# Patient Record
Sex: MALE | Race: AMERICAN INDIAN OR ALASKA NATIVE | NOT HISPANIC OR LATINO | RURAL
[De-identification: names, ages, dates, MRNs, and addresses within clinical notes are randomized per-mention and may not be internally consistent; named-entity substitution may affect disease eponyms.]

---

## 2017-02-10 ENCOUNTER — HISTORICAL (OUTPATIENT)
Dept: ADMINISTRATIVE | Facility: HOSPITAL | Age: 37
End: 2017-02-10

## 2017-03-02 LAB
LAB AP CLINICAL INFORMATION: NORMAL
LAB AP DIAGNOSIS - HISTORICAL: NORMAL
LAB AP GROSS PATHOLOGY - HISTORICAL: NORMAL
LAB AP SPECIMEN SUBMITTED - HISTORICAL: NORMAL

## 2023-07-16 ENCOUNTER — HOSPITAL ENCOUNTER (INPATIENT)
Facility: HOSPITAL | Age: 43
LOS: 3 days | Discharge: HOME | DRG: 673 | End: 2023-07-19
Attending: INTERNAL MEDICINE | Admitting: HOSPITALIST
Payer: MEDICARE

## 2023-07-16 DIAGNOSIS — E11.22 TYPE 2 DIABETES MELLITUS WITH STAGE 5 CHRONIC KIDNEY DISEASE NOT ON CHRONIC DIALYSIS, UNSPECIFIED WHETHER LONG TERM INSULIN USE: ICD-10-CM

## 2023-07-16 DIAGNOSIS — D63.8 ANEMIA OF CHRONIC DISEASE: ICD-10-CM

## 2023-07-16 DIAGNOSIS — N19 UREMIA DUE TO INADEQUATE RENAL PERFUSION: ICD-10-CM

## 2023-07-16 DIAGNOSIS — I10 PRIMARY HYPERTENSION: ICD-10-CM

## 2023-07-16 DIAGNOSIS — I63.9 LEFT-SIDED CEREBROVASCULAR ACCIDENT (CVA): ICD-10-CM

## 2023-07-16 DIAGNOSIS — Z01.818 PREOPERATIVE EVALUATION OF A MEDICAL CONDITION TO RULE OUT SURGICAL CONTRAINDICATIONS (TAR REQUIRED): ICD-10-CM

## 2023-07-16 DIAGNOSIS — K21.9 GASTROESOPHAGEAL REFLUX DISEASE, UNSPECIFIED WHETHER ESOPHAGITIS PRESENT: ICD-10-CM

## 2023-07-16 DIAGNOSIS — I10 HYPERTENSION: ICD-10-CM

## 2023-07-16 DIAGNOSIS — I10 HYPERTENSION, UNSPECIFIED TYPE: ICD-10-CM

## 2023-07-16 DIAGNOSIS — N18.6 ESRD (END STAGE RENAL DISEASE): Primary | ICD-10-CM

## 2023-07-16 DIAGNOSIS — N17.9 ACUTE RENAL FAILURE: ICD-10-CM

## 2023-07-16 DIAGNOSIS — N18.5 TYPE 2 DIABETES MELLITUS WITH STAGE 5 CHRONIC KIDNEY DISEASE NOT ON CHRONIC DIALYSIS, UNSPECIFIED WHETHER LONG TERM INSULIN USE: ICD-10-CM

## 2023-07-16 PROBLEM — E11.29 TYPE 2 DIABETES MELLITUS WITH RENAL COMPLICATION: Status: ACTIVE | Noted: 2023-07-16

## 2023-07-16 PROBLEM — Z89.511 ACQUIRED ABSENCE OF RIGHT LEG BELOW KNEE: Status: ACTIVE | Noted: 2019-09-04

## 2023-07-16 PROBLEM — E78.00 HYPERCHOLESTEREMIA: Status: ACTIVE | Noted: 2019-08-30

## 2023-07-16 LAB
ALBUMIN SERPL BCP-MCNC: 1.8 G/DL (ref 3.5–5)
ALBUMIN/GLOB SERPL: 0.4 {RATIO}
ALP SERPL-CCNC: 106 U/L (ref 45–115)
ALT SERPL W P-5'-P-CCNC: 10 U/L (ref 16–61)
ANION GAP SERPL CALCULATED.3IONS-SCNC: 17 MMOL/L (ref 7–16)
APTT PPP: 54.9 SECONDS (ref 25.2–37.3)
AST SERPL W P-5'-P-CCNC: 14 U/L (ref 15–37)
BASOPHILS # BLD AUTO: 0.03 K/UL (ref 0–0.2)
BASOPHILS NFR BLD AUTO: 0.4 % (ref 0–1)
BILIRUB SERPL-MCNC: 0.3 MG/DL (ref ?–1.2)
BUN SERPL-MCNC: 56 MG/DL (ref 7–18)
BUN/CREAT SERPL: 5 (ref 6–20)
CALCIUM SERPL-MCNC: 7.2 MG/DL (ref 8.5–10.1)
CHLORIDE SERPL-SCNC: 101 MMOL/L (ref 98–107)
CO2 SERPL-SCNC: 23 MMOL/L (ref 21–32)
CREAT SERPL-MCNC: 10.49 MG/DL (ref 0.7–1.3)
DIFFERENTIAL METHOD BLD: ABNORMAL
EGFR (NO RACE VARIABLE) (RUSH/TITUS): 6 ML/MIN/1.73M2
EOSINOPHIL # BLD AUTO: 0.12 K/UL (ref 0–0.5)
EOSINOPHIL NFR BLD AUTO: 1.6 % (ref 1–4)
ERYTHROCYTE [DISTWIDTH] IN BLOOD BY AUTOMATED COUNT: 13.7 % (ref 11.5–14.5)
FERRITIN SERPL-MCNC: 519 NG/ML (ref 26–388)
FOLATE SERPL-MCNC: 14.3 NG/ML (ref 3.1–17.5)
GLOBULIN SER-MCNC: 4.8 G/DL (ref 2–4)
GLUCOSE SERPL-MCNC: 87 MG/DL (ref 74–106)
HCT VFR BLD AUTO: 26.1 % (ref 40–54)
HGB BLD-MCNC: 9 G/DL (ref 13.5–18)
IMM GRANULOCYTES # BLD AUTO: 0.13 K/UL (ref 0–0.04)
IMM GRANULOCYTES NFR BLD: 1.8 % (ref 0–0.4)
INDIRECT COOMBS: NORMAL
INR BLD: 1.17
IRON SATN MFR SERPL: 40 % (ref 14–50)
IRON SERPL-MCNC: 53 ΜG/DL (ref 65–175)
LYMPHOCYTES # BLD AUTO: 1.91 K/UL (ref 1–4.8)
LYMPHOCYTES NFR BLD AUTO: 25.8 % (ref 27–41)
MAGNESIUM SERPL-MCNC: 1.7 MG/DL (ref 1.7–2.3)
MCH RBC QN AUTO: 29.9 PG (ref 27–31)
MCHC RBC AUTO-ENTMCNC: 34.5 G/DL (ref 32–36)
MCV RBC AUTO: 86.7 FL (ref 80–96)
MONOCYTES # BLD AUTO: 0.45 K/UL (ref 0–0.8)
MONOCYTES NFR BLD AUTO: 6.1 % (ref 2–6)
MPC BLD CALC-MCNC: 7.9 FL (ref 9.4–12.4)
NEUTROPHILS # BLD AUTO: 4.76 K/UL (ref 1.8–7.7)
NEUTROPHILS NFR BLD AUTO: 64.3 % (ref 53–65)
NRBC # BLD AUTO: 0 X10E3/UL
NRBC, AUTO (.00): 0 %
PHOSPHATE SERPL-MCNC: 8.3 MG/DL (ref 2.5–4.5)
PLATELET # BLD AUTO: 449 K/UL (ref 150–400)
POTASSIUM SERPL-SCNC: 3.2 MMOL/L (ref 3.5–5.1)
PROT SERPL-MCNC: 6.6 G/DL (ref 6.4–8.2)
PROTHROMBIN TIME: 14.8 SECONDS (ref 11.7–14.7)
RBC # BLD AUTO: 3.01 M/UL (ref 4.6–6.2)
RH BLD: NORMAL
SODIUM SERPL-SCNC: 138 MMOL/L (ref 136–145)
SPECIMEN OUTDATE: NORMAL
TIBC SERPL-MCNC: 132 ΜG/DL (ref 250–450)
TSH SERPL DL<=0.005 MIU/L-ACNC: 1.81 UIU/ML (ref 0.36–3.74)
WBC # BLD AUTO: 7.4 K/UL (ref 4.5–11)

## 2023-07-16 PROCEDURE — 83540 ASSAY OF IRON: CPT | Performed by: HOSPITALIST

## 2023-07-16 PROCEDURE — 11000001 HC ACUTE MED/SURG PRIVATE ROOM

## 2023-07-16 PROCEDURE — 82746 ASSAY OF FOLIC ACID SERUM: CPT | Performed by: HOSPITALIST

## 2023-07-16 PROCEDURE — 84443 ASSAY THYROID STIM HORMONE: CPT | Performed by: HOSPITALIST

## 2023-07-16 PROCEDURE — 99223 PR INITIAL HOSPITAL CARE,LEVL III: ICD-10-PCS | Mod: ,,, | Performed by: HOSPITALIST

## 2023-07-16 PROCEDURE — 86900 BLOOD TYPING SEROLOGIC ABO: CPT | Performed by: HOSPITALIST

## 2023-07-16 PROCEDURE — 85610 PROTHROMBIN TIME: CPT | Performed by: HOSPITALIST

## 2023-07-16 PROCEDURE — 83735 ASSAY OF MAGNESIUM: CPT | Performed by: HOSPITALIST

## 2023-07-16 PROCEDURE — 82728 ASSAY OF FERRITIN: CPT | Performed by: HOSPITALIST

## 2023-07-16 PROCEDURE — 84100 ASSAY OF PHOSPHORUS: CPT | Performed by: HOSPITALIST

## 2023-07-16 PROCEDURE — 85730 THROMBOPLASTIN TIME PARTIAL: CPT | Performed by: HOSPITALIST

## 2023-07-16 PROCEDURE — 80053 COMPREHEN METABOLIC PANEL: CPT | Performed by: HOSPITALIST

## 2023-07-16 PROCEDURE — 99223 1ST HOSP IP/OBS HIGH 75: CPT | Mod: ,,, | Performed by: HOSPITALIST

## 2023-07-16 PROCEDURE — 83550 IRON BINDING TEST: CPT | Performed by: HOSPITALIST

## 2023-07-16 PROCEDURE — 85025 COMPLETE CBC W/AUTO DIFF WBC: CPT | Performed by: HOSPITALIST

## 2023-07-16 PROCEDURE — 82607 VITAMIN B-12: CPT | Performed by: HOSPITALIST

## 2023-07-16 RX ORDER — OMEPRAZOLE 20 MG/1
20 CAPSULE, DELAYED RELEASE ORAL DAILY
COMMUNITY

## 2023-07-16 RX ORDER — HYDRALAZINE HYDROCHLORIDE 10 MG/1
10 TABLET, FILM COATED ORAL EVERY 8 HOURS
Status: DISCONTINUED | OUTPATIENT
Start: 2023-07-17 | End: 2023-07-19 | Stop reason: HOSPADM

## 2023-07-16 RX ORDER — TRAZODONE HYDROCHLORIDE 50 MG/1
50 TABLET ORAL NIGHTLY PRN
Status: DISCONTINUED | OUTPATIENT
Start: 2023-07-16 | End: 2023-07-19 | Stop reason: HOSPADM

## 2023-07-16 RX ORDER — TAMSULOSIN HYDROCHLORIDE 0.4 MG/1
0.4 CAPSULE ORAL DAILY
Status: ON HOLD | COMMUNITY
End: 2023-07-19 | Stop reason: HOSPADM

## 2023-07-16 RX ORDER — GLIPIZIDE 2.5 MG/1
2.5 TABLET, EXTENDED RELEASE ORAL
Status: ON HOLD | COMMUNITY
End: 2023-07-19 | Stop reason: HOSPADM

## 2023-07-16 RX ORDER — CARVEDILOL 3.12 MG/1
3.12 TABLET ORAL 2 TIMES DAILY
COMMUNITY

## 2023-07-16 RX ORDER — ACETAMINOPHEN 500 MG
1000 TABLET ORAL EVERY 6 HOURS PRN
Status: DISCONTINUED | OUTPATIENT
Start: 2023-07-16 | End: 2023-07-19 | Stop reason: HOSPADM

## 2023-07-16 RX ORDER — HYDRALAZINE HYDROCHLORIDE 10 MG/1
10 TABLET, FILM COATED ORAL EVERY 8 HOURS
COMMUNITY

## 2023-07-16 RX ORDER — GUAIFENESIN/DEXTROMETHORPHAN 100-10MG/5
10 SYRUP ORAL EVERY 6 HOURS PRN
Status: DISCONTINUED | OUTPATIENT
Start: 2023-07-16 | End: 2023-07-19 | Stop reason: HOSPADM

## 2023-07-16 RX ORDER — FERROUS SULFATE 325(65) MG
325 TABLET, DELAYED RELEASE (ENTERIC COATED) ORAL 2 TIMES DAILY
COMMUNITY

## 2023-07-16 RX ORDER — SIMETHICONE 80 MG
1 TABLET,CHEWABLE ORAL 3 TIMES DAILY PRN
Status: DISCONTINUED | OUTPATIENT
Start: 2023-07-16 | End: 2023-07-19 | Stop reason: HOSPADM

## 2023-07-16 RX ORDER — ATORVASTATIN CALCIUM 40 MG/1
40 TABLET, FILM COATED ORAL NIGHTLY
Status: DISCONTINUED | OUTPATIENT
Start: 2023-07-17 | End: 2023-07-19 | Stop reason: HOSPADM

## 2023-07-16 RX ORDER — CLOPIDOGREL BISULFATE 75 MG/1
75 TABLET ORAL DAILY
COMMUNITY

## 2023-07-16 RX ORDER — ISOSORBIDE MONONITRATE 30 MG/1
30 TABLET, EXTENDED RELEASE ORAL DAILY
Status: ON HOLD | COMMUNITY
End: 2023-07-19 | Stop reason: HOSPADM

## 2023-07-16 RX ORDER — FUROSEMIDE 80 MG/1
80 TABLET ORAL 2 TIMES DAILY PRN
Status: ON HOLD | COMMUNITY
End: 2023-07-19 | Stop reason: HOSPADM

## 2023-07-16 RX ORDER — ONDANSETRON 2 MG/ML
8 INJECTION INTRAMUSCULAR; INTRAVENOUS EVERY 6 HOURS PRN
Status: DISCONTINUED | OUTPATIENT
Start: 2023-07-16 | End: 2023-07-19 | Stop reason: HOSPADM

## 2023-07-16 RX ORDER — NIFEDIPINE 30 MG/1
30 TABLET, FILM COATED, EXTENDED RELEASE ORAL DAILY
Status: ON HOLD | COMMUNITY
End: 2023-07-19 | Stop reason: HOSPADM

## 2023-07-16 RX ORDER — CARVEDILOL 3.12 MG/1
3.12 TABLET ORAL 2 TIMES DAILY
Status: DISCONTINUED | OUTPATIENT
Start: 2023-07-17 | End: 2023-07-19 | Stop reason: HOSPADM

## 2023-07-16 RX ORDER — ROSUVASTATIN CALCIUM 40 MG/1
40 TABLET, COATED ORAL NIGHTLY
COMMUNITY

## 2023-07-16 RX ORDER — BISACODYL 5 MG
10 TABLET, DELAYED RELEASE (ENTERIC COATED) ORAL DAILY PRN
Status: DISCONTINUED | OUTPATIENT
Start: 2023-07-16 | End: 2023-07-19 | Stop reason: HOSPADM

## 2023-07-16 RX ORDER — TAMSULOSIN HYDROCHLORIDE 0.4 MG/1
0.4 CAPSULE ORAL DAILY
Status: DISCONTINUED | OUTPATIENT
Start: 2023-07-17 | End: 2023-07-19

## 2023-07-16 RX ORDER — SODIUM BICARBONATE 650 MG/1
650 TABLET ORAL 3 TIMES DAILY
Status: ON HOLD | COMMUNITY
End: 2023-07-19 | Stop reason: HOSPADM

## 2023-07-17 ENCOUNTER — ANESTHESIA (OUTPATIENT)
Dept: SURGERY | Facility: HOSPITAL | Age: 43
DRG: 673 | End: 2023-07-17
Payer: MEDICARE

## 2023-07-17 ENCOUNTER — ANESTHESIA EVENT (OUTPATIENT)
Dept: SURGERY | Facility: HOSPITAL | Age: 43
DRG: 673 | End: 2023-07-17
Payer: MEDICARE

## 2023-07-17 PROBLEM — N19 UREMIA DUE TO INADEQUATE RENAL PERFUSION: Status: ACTIVE | Noted: 2023-07-17

## 2023-07-17 LAB
25(OH)D3 SERPL-MCNC: 12.2 NG/ML
CHOLEST SERPL-MCNC: 73 MG/DL (ref 0–200)
CHOLEST/HDLC SERPL: 2.3 {RATIO}
GLUCOSE SERPL-MCNC: 100 MG/DL (ref 70–105)
GLUCOSE SERPL-MCNC: 111 MG/DL (ref 70–105)
GLUCOSE SERPL-MCNC: 231 MG/DL (ref 70–105)
HAV IGM SER QL: NORMAL
HBV CORE IGM SER QL: NORMAL
HBV SURFACE AG SERPL QL IA: NORMAL
HCV AB SER QL: NORMAL
HDLC SERPL-MCNC: 32 MG/DL (ref 40–60)
LDLC SERPL CALC-MCNC: 14 MG/DL
NONHDLC SERPL-MCNC: 41 MG/DL
TRIGL SERPL-MCNC: 136 MG/DL (ref 35–150)
VIT B12 SERPL-MCNC: 747 PG/ML (ref 193–986)
VLDLC SERPL-MCNC: 27 MG/DL

## 2023-07-17 PROCEDURE — 27000177 HC AIRWAY, LARYNGEAL MASK: Performed by: NURSE ANESTHETIST, CERTIFIED REGISTERED

## 2023-07-17 PROCEDURE — 93010 ELECTROCARDIOGRAM REPORT: CPT | Mod: ,,, | Performed by: HOSPITALIST

## 2023-07-17 PROCEDURE — 25000003 PHARM REV CODE 250: Performed by: NURSE ANESTHETIST, CERTIFIED REGISTERED

## 2023-07-17 PROCEDURE — 99223 PR INITIAL HOSPITAL CARE,LEVL III: ICD-10-PCS | Mod: 25,,, | Performed by: STUDENT IN AN ORGANIZED HEALTH CARE EDUCATION/TRAINING PROGRAM

## 2023-07-17 PROCEDURE — 99223 1ST HOSP IP/OBS HIGH 75: CPT | Mod: 25,,, | Performed by: STUDENT IN AN ORGANIZED HEALTH CARE EDUCATION/TRAINING PROGRAM

## 2023-07-17 PROCEDURE — 99233 SBSQ HOSP IP/OBS HIGH 50: CPT | Mod: ,,, | Performed by: HOSPITALIST

## 2023-07-17 PROCEDURE — 82306 VITAMIN D 25 HYDROXY: CPT

## 2023-07-17 PROCEDURE — 80061 LIPID PANEL: CPT

## 2023-07-17 PROCEDURE — 11000001 HC ACUTE MED/SURG PRIVATE ROOM

## 2023-07-17 PROCEDURE — 94761 N-INVAS EAR/PLS OXIMETRY MLT: CPT

## 2023-07-17 PROCEDURE — 36000706: Performed by: STUDENT IN AN ORGANIZED HEALTH CARE EDUCATION/TRAINING PROGRAM

## 2023-07-17 PROCEDURE — 71000033 HC RECOVERY, INTIAL HOUR: Performed by: STUDENT IN AN ORGANIZED HEALTH CARE EDUCATION/TRAINING PROGRAM

## 2023-07-17 PROCEDURE — D9220A PRA ANESTHESIA: ICD-10-PCS | Mod: CRNA,,, | Performed by: NURSE ANESTHETIST, CERTIFIED REGISTERED

## 2023-07-17 PROCEDURE — 77001 CHG FLUOROGUIDE CNTRL VEN ACCESS,PLACE,REPLACE,REMOVE: ICD-10-PCS | Mod: 26,,, | Performed by: STUDENT IN AN ORGANIZED HEALTH CARE EDUCATION/TRAINING PROGRAM

## 2023-07-17 PROCEDURE — 36558 PR INSERT TUNNELED CV CATH W/O PORT OR PUMP: ICD-10-PCS | Mod: RT,,, | Performed by: STUDENT IN AN ORGANIZED HEALTH CARE EDUCATION/TRAINING PROGRAM

## 2023-07-17 PROCEDURE — 27000510 HC BLANKET BAIR HUGGER ANY SIZE: Performed by: NURSE ANESTHETIST, CERTIFIED REGISTERED

## 2023-07-17 PROCEDURE — 27000716 HC OXISENSOR PROBE, ANY SIZE: Performed by: NURSE ANESTHETIST, CERTIFIED REGISTERED

## 2023-07-17 PROCEDURE — 82962 GLUCOSE BLOOD TEST: CPT

## 2023-07-17 PROCEDURE — 36558 INSERT TUNNELED CV CATH: CPT | Mod: RT,,, | Performed by: STUDENT IN AN ORGANIZED HEALTH CARE EDUCATION/TRAINING PROGRAM

## 2023-07-17 PROCEDURE — 63600175 PHARM REV CODE 636 W HCPCS: Performed by: STUDENT IN AN ORGANIZED HEALTH CARE EDUCATION/TRAINING PROGRAM

## 2023-07-17 PROCEDURE — 63600175 PHARM REV CODE 636 W HCPCS: Performed by: INTERNAL MEDICINE

## 2023-07-17 PROCEDURE — 90935 HEMODIALYSIS ONE EVALUATION: CPT

## 2023-07-17 PROCEDURE — 36000707: Performed by: STUDENT IN AN ORGANIZED HEALTH CARE EDUCATION/TRAINING PROGRAM

## 2023-07-17 PROCEDURE — D9220A PRA ANESTHESIA: ICD-10-PCS | Mod: ANES,,, | Performed by: ANESTHESIOLOGY

## 2023-07-17 PROCEDURE — 63600175 PHARM REV CODE 636 W HCPCS: Performed by: NURSE ANESTHETIST, CERTIFIED REGISTERED

## 2023-07-17 PROCEDURE — 27000655: Performed by: NURSE ANESTHETIST, CERTIFIED REGISTERED

## 2023-07-17 PROCEDURE — 25000003 PHARM REV CODE 250: Performed by: HOSPITALIST

## 2023-07-17 PROCEDURE — 93010 EKG 12-LEAD: ICD-10-PCS | Mod: ,,, | Performed by: HOSPITALIST

## 2023-07-17 PROCEDURE — C1750 CATH, HEMODIALYSIS,LONG-TERM: HCPCS | Performed by: STUDENT IN AN ORGANIZED HEALTH CARE EDUCATION/TRAINING PROGRAM

## 2023-07-17 PROCEDURE — 25000003 PHARM REV CODE 250: Performed by: INTERNAL MEDICINE

## 2023-07-17 PROCEDURE — 77001 FLUOROGUIDE FOR VEIN DEVICE: CPT | Mod: 26,,, | Performed by: STUDENT IN AN ORGANIZED HEALTH CARE EDUCATION/TRAINING PROGRAM

## 2023-07-17 PROCEDURE — D9220A PRA ANESTHESIA: Mod: ANES,,, | Performed by: ANESTHESIOLOGY

## 2023-07-17 PROCEDURE — 25000003 PHARM REV CODE 250

## 2023-07-17 PROCEDURE — 25000003 PHARM REV CODE 250: Performed by: STUDENT IN AN ORGANIZED HEALTH CARE EDUCATION/TRAINING PROGRAM

## 2023-07-17 PROCEDURE — 93005 ELECTROCARDIOGRAM TRACING: CPT

## 2023-07-17 PROCEDURE — 37000008 HC ANESTHESIA 1ST 15 MINUTES: Performed by: STUDENT IN AN ORGANIZED HEALTH CARE EDUCATION/TRAINING PROGRAM

## 2023-07-17 PROCEDURE — D9220A PRA ANESTHESIA: Mod: CRNA,,, | Performed by: NURSE ANESTHETIST, CERTIFIED REGISTERED

## 2023-07-17 PROCEDURE — 37000009 HC ANESTHESIA EA ADD 15 MINS: Performed by: STUDENT IN AN ORGANIZED HEALTH CARE EDUCATION/TRAINING PROGRAM

## 2023-07-17 PROCEDURE — 99233 PR SUBSEQUENT HOSPITAL CARE,LEVL III: ICD-10-PCS | Mod: ,,, | Performed by: HOSPITALIST

## 2023-07-17 PROCEDURE — 80074 ACUTE HEPATITIS PANEL: CPT | Performed by: INTERNAL MEDICINE

## 2023-07-17 DEVICE — CATH GLIDEPATH 14.5F 23CM 28CM: Type: IMPLANTABLE DEVICE | Site: CHEST | Status: FUNCTIONAL

## 2023-07-17 RX ORDER — SODIUM CHLORIDE 9 MG/ML
INJECTION, SOLUTION INTRAVENOUS CONTINUOUS PRN
Status: DISCONTINUED | OUTPATIENT
Start: 2023-07-17 | End: 2023-07-17

## 2023-07-17 RX ORDER — MORPHINE SULFATE 10 MG/ML
4 INJECTION INTRAMUSCULAR; INTRAVENOUS; SUBCUTANEOUS EVERY 5 MIN PRN
Status: DISCONTINUED | OUTPATIENT
Start: 2023-07-17 | End: 2023-07-17 | Stop reason: HOSPADM

## 2023-07-17 RX ORDER — CHOLECALCIFEROL (VITAMIN D3) 25 MCG
1000 TABLET ORAL DAILY
Status: DISCONTINUED | OUTPATIENT
Start: 2023-07-18 | End: 2023-07-19 | Stop reason: HOSPADM

## 2023-07-17 RX ORDER — LIDOCAINE HYDROCHLORIDE 20 MG/ML
INJECTION, SOLUTION EPIDURAL; INFILTRATION; INTRACAUDAL; PERINEURAL
Status: DISCONTINUED | OUTPATIENT
Start: 2023-07-17 | End: 2023-07-17

## 2023-07-17 RX ORDER — ONDANSETRON 2 MG/ML
4 INJECTION INTRAMUSCULAR; INTRAVENOUS DAILY PRN
Status: DISCONTINUED | OUTPATIENT
Start: 2023-07-17 | End: 2023-07-17 | Stop reason: HOSPADM

## 2023-07-17 RX ORDER — MEPERIDINE HYDROCHLORIDE 25 MG/ML
25 INJECTION INTRAMUSCULAR; INTRAVENOUS; SUBCUTANEOUS ONCE AS NEEDED
Status: DISCONTINUED | OUTPATIENT
Start: 2023-07-17 | End: 2023-07-17 | Stop reason: HOSPADM

## 2023-07-17 RX ORDER — HEPARIN SODIUM 1000 [USP'U]/ML
INJECTION, SOLUTION INTRAVENOUS; SUBCUTANEOUS
Status: DISCONTINUED | OUTPATIENT
Start: 2023-07-17 | End: 2023-07-17 | Stop reason: HOSPADM

## 2023-07-17 RX ORDER — IBUPROFEN 200 MG
16 TABLET ORAL
Status: DISCONTINUED | OUTPATIENT
Start: 2023-07-17 | End: 2023-07-19 | Stop reason: HOSPADM

## 2023-07-17 RX ORDER — GLUCAGON 1 MG
1 KIT INJECTION
Status: DISCONTINUED | OUTPATIENT
Start: 2023-07-17 | End: 2023-07-19 | Stop reason: HOSPADM

## 2023-07-17 RX ORDER — ETOMIDATE 2 MG/ML
INJECTION INTRAVENOUS
Status: DISCONTINUED | OUTPATIENT
Start: 2023-07-17 | End: 2023-07-17

## 2023-07-17 RX ORDER — HEPARIN SODIUM 1000 [USP'U]/ML
4000 INJECTION, SOLUTION INTRAVENOUS; SUBCUTANEOUS
Status: DISCONTINUED | OUTPATIENT
Start: 2023-07-17 | End: 2023-07-19 | Stop reason: HOSPADM

## 2023-07-17 RX ORDER — PHENYLEPHRINE HYDROCHLORIDE 10 MG/ML
INJECTION INTRAVENOUS
Status: DISCONTINUED | OUTPATIENT
Start: 2023-07-17 | End: 2023-07-17

## 2023-07-17 RX ORDER — MUPIROCIN 20 MG/G
OINTMENT TOPICAL 2 TIMES DAILY
Status: DISCONTINUED | OUTPATIENT
Start: 2023-07-17 | End: 2023-07-19 | Stop reason: HOSPADM

## 2023-07-17 RX ORDER — HYDROMORPHONE HYDROCHLORIDE 2 MG/ML
0.5 INJECTION, SOLUTION INTRAMUSCULAR; INTRAVENOUS; SUBCUTANEOUS EVERY 5 MIN PRN
Status: DISCONTINUED | OUTPATIENT
Start: 2023-07-17 | End: 2023-07-17 | Stop reason: HOSPADM

## 2023-07-17 RX ORDER — PROPOFOL 10 MG/ML
VIAL (ML) INTRAVENOUS
Status: DISCONTINUED | OUTPATIENT
Start: 2023-07-17 | End: 2023-07-17

## 2023-07-17 RX ORDER — IBUPROFEN 200 MG
24 TABLET ORAL
Status: DISCONTINUED | OUTPATIENT
Start: 2023-07-17 | End: 2023-07-19 | Stop reason: HOSPADM

## 2023-07-17 RX ORDER — DIPHENHYDRAMINE HYDROCHLORIDE 50 MG/ML
25 INJECTION INTRAMUSCULAR; INTRAVENOUS EVERY 6 HOURS PRN
Status: DISCONTINUED | OUTPATIENT
Start: 2023-07-17 | End: 2023-07-17 | Stop reason: HOSPADM

## 2023-07-17 RX ORDER — LIDOCAINE HYDROCHLORIDE 10 MG/ML
INJECTION INFILTRATION; PERINEURAL
Status: DISCONTINUED | OUTPATIENT
Start: 2023-07-17 | End: 2023-07-17 | Stop reason: HOSPADM

## 2023-07-17 RX ORDER — HYDRALAZINE HYDROCHLORIDE 20 MG/ML
10 INJECTION INTRAMUSCULAR; INTRAVENOUS EVERY 6 HOURS PRN
Status: DISCONTINUED | OUTPATIENT
Start: 2023-07-17 | End: 2023-07-19 | Stop reason: HOSPADM

## 2023-07-17 RX ORDER — SODIUM CHLORIDE 9 MG/ML
INJECTION, SOLUTION INTRAVENOUS
Status: DISCONTINUED | OUTPATIENT
Start: 2023-07-17 | End: 2023-07-19 | Stop reason: HOSPADM

## 2023-07-17 RX ORDER — INSULIN ASPART 100 [IU]/ML
0-5 INJECTION, SOLUTION INTRAVENOUS; SUBCUTANEOUS
Status: DISCONTINUED | OUTPATIENT
Start: 2023-07-17 | End: 2023-07-19 | Stop reason: HOSPADM

## 2023-07-17 RX ORDER — FENTANYL CITRATE 50 UG/ML
INJECTION, SOLUTION INTRAMUSCULAR; INTRAVENOUS
Status: DISCONTINUED | OUTPATIENT
Start: 2023-07-17 | End: 2023-07-17

## 2023-07-17 RX ORDER — SODIUM CHLORIDE 9 MG/ML
INJECTION, SOLUTION INTRAVENOUS CONTINUOUS
Status: CANCELLED | OUTPATIENT
Start: 2023-07-17

## 2023-07-17 RX ADMIN — PROPOFOL 100 MG: 10 INJECTION, EMULSION INTRAVENOUS at 12:07

## 2023-07-17 RX ADMIN — INSULIN ASPART 1 UNITS: 100 INJECTION, SOLUTION INTRAVENOUS; SUBCUTANEOUS at 09:07

## 2023-07-17 RX ADMIN — MUPIROCIN: 20 OINTMENT TOPICAL at 09:07

## 2023-07-17 RX ADMIN — LIDOCAINE HYDROCHLORIDE 100 MG: 20 INJECTION, SOLUTION INTRAVENOUS at 12:07

## 2023-07-17 RX ADMIN — FENTANYL CITRATE 25 MCG: 50 INJECTION INTRAMUSCULAR; INTRAVENOUS at 12:07

## 2023-07-17 RX ADMIN — PHENYLEPHRINE HYDROCHLORIDE 100 MCG: 10 INJECTION INTRAVENOUS at 12:07

## 2023-07-17 RX ADMIN — TAMSULOSIN HYDROCHLORIDE 0.4 MG: 0.4 CAPSULE ORAL at 08:07

## 2023-07-17 RX ADMIN — HYDRALAZINE HYDROCHLORIDE 10 MG: 10 TABLET, FILM COATED ORAL at 09:07

## 2023-07-17 RX ADMIN — SODIUM CHLORIDE: 9 INJECTION, SOLUTION INTRAVENOUS at 02:07

## 2023-07-17 RX ADMIN — CEFAZOLIN 2 G: 1 INJECTION, POWDER, FOR SOLUTION INTRAMUSCULAR; INTRAVENOUS; PARENTERAL at 12:07

## 2023-07-17 RX ADMIN — ATORVASTATIN CALCIUM 40 MG: 40 TABLET, FILM COATED ORAL at 09:07

## 2023-07-17 RX ADMIN — HEPARIN SODIUM 4000 UNITS: 1000 INJECTION, SOLUTION INTRAVENOUS; SUBCUTANEOUS at 04:07

## 2023-07-17 RX ADMIN — ETOMIDATE 10 MG: 2 INJECTION, SOLUTION INTRAVENOUS at 01:07

## 2023-07-17 RX ADMIN — SODIUM CHLORIDE: 9 INJECTION, SOLUTION INTRAVENOUS at 12:07

## 2023-07-17 RX ADMIN — CARVEDILOL 3.12 MG: 3.12 TABLET, FILM COATED ORAL at 09:07

## 2023-07-17 RX ADMIN — CARVEDILOL 3.12 MG: 3.12 TABLET, FILM COATED ORAL at 08:07

## 2023-07-17 RX ADMIN — HYDRALAZINE HYDROCHLORIDE 10 MG: 10 TABLET, FILM COATED ORAL at 08:07

## 2023-07-17 RX ADMIN — PHENYLEPHRINE HYDROCHLORIDE 100 MCG: 10 INJECTION INTRAVENOUS at 01:07

## 2023-07-17 NOTE — HPI
This patient with history of DM, HTN and CKD who has been followed by  Dr. Turner.  The patient has had continued progression of renal dysfunction.  He has agreed to start dialysis.

## 2023-07-17 NOTE — ASSESSMENT & PLAN NOTE
- Pending A1c  - Hold Oral hypoglycemics while patient is in the hospital.  - Nephrology consulted for dialysis  - Will continue to monitor blood glucose in the setting of ESRD    7/17/2023: POCT glucose QID. SSI. Will make adjustments as necessary

## 2023-07-17 NOTE — ASSESSMENT & PLAN NOTE
To the OR for tunneled hemodialysis catheter insertion.      Risks and benefits explained with the patient including risks for infection, bleeding, injury to surrounding structures, hematoma/seroma formation with need for possible evacuation, possible open.  The patient verbalized understanding, agrees and wishes to proceed with surgery.    We will also order vein mapping to evaluate bilateral upper extremities to schedule AV fistula as an outpatient.

## 2023-07-17 NOTE — H&P
[facility]  [facility] Medicine  History & Physical    Patient Name: Johnathan Patino  MRN: [id number]  Patient Class: IP- Inpatient  Admission Date: 7/16/2023  Attending Physician: Dimitri Myers MD   Primary Care Provider: Primary Doctor No         Patient information was obtained from patient, spouse/SO, past medical records and ER records.     Subjective:     Principal Problem:ESRD (end stage renal disease)    Chief Complaint:   Chief Complaint   Patient presents with    Fatigue    Decreased Appetite        HPI: Patient is a 44 yo male who presents to the hospital from [facility] for renal failure needing dialysis. Patient presented to the [facility] Emergency Department earlier today with a complaint of fatigue, decreased appetite and weakness for several days. He denied any associated chest pain, shortness of breath, palpitations, abdominal pain, nausea and vomit. He has history of chronic renal disease and follows with Dr. Turner. According to his girlfriend who was present at the bedside, the patient was supposed to have an AV fistula to start dialysis, but this has not been done due to elevated blood pressure. Of note, the girlfriend noted that the patient went to the [facility] ED on Thursday, July 6th with a complaint of back pain and was treated with pain medication and muscle relaxer. He returned to the emergency department the next day on Friday, July 7th with a complaint of abdominal pain and was diagnosed with pancreatitis, for which he was prescribed oxycodone. Nevertheless, the patient noted that the fatigue, decreased appetite and weakness continues to worsen over the past few days which prompted him to return to the ED earlier today. 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     In [facility] ED, work up was significant  for BUN/cR 57/10.4 ( as per medical review baseline CR was 1.41 in March 2021) and GFR 6, K 3.2, Liver enzyme normal, Na 137, H&H 6.0/18 with MCV 87. On arrival, the patient was afebrile and vital signs stable. He complained of weakness, fatigue and decreased appetite, but had no other concerns or complaints. Patient was admitted for further evaluation and management for renal failure.           Past Medical History:   Diagnosis Date    Chronic kidney disease (CKD), stage V     Coronary artery disease     Diabetes mellitus     Hypertension     Stroke        Past Surgical History:   Procedure Laterality Date    AMPUTATION, LOWER LIMB Right        Review of patient's allergies indicates:  Not on File    No current facility-administered medications on file prior to encounter.     Current Outpatient Medications on File Prior to Encounter   Medication Sig    carvediloL (COREG) 3.125 MG tablet Take 3.125 mg by mouth 2 (two) times daily.    clopidogreL (PLAVIX) 75 mg tablet Take 75 mg by mouth once daily.    ferrous sulfate 325 (65 FE) MG EC tablet Take 325 mg by mouth 2 (two) times daily.    furosemide (LASIX) 80 MG tablet Take 80 mg by mouth 2 (two) times daily as needed.    glipiZIDE (GLUCOTROL) 2.5 MG TR24 Take 2.5 mg by mouth daily with breakfast.    hydrALAZINE (APRESOLINE) 10 MG tablet Take 10 mg by mouth every 8 (eight) hours.    isosorbide mononitrate (IMDUR) 30 MG 24 hr tablet Take 30 mg by mouth once daily. Take half Tab daily    NIFEdipine (ADALAT CC) 30 MG TbSR Take 30 mg by mouth once daily.    omeprazole (PRILOSEC) 20 MG capsule Take 20 mg by mouth once daily.    rosuvastatin (CRESTOR) 40 MG Tab Take 40 mg by mouth every evening.    sodium bicarbonate 650 MG tablet Take 650 mg by mouth 3 (three) times daily. Take 2 Tab  three times a day    tamsulosin (FLOMAX) 0.4 mg Cap Take 0.4 mg by mouth once daily.     Family History       Problem Relation (Age of Onset)    Diabetes Mother, Father     Hypertension Mother, Father, Sister, Brother          Tobacco Use    Smoking status: Unknown    Smokeless tobacco: Not on file   Substance and Sexual Activity    Alcohol use: Not on file    Drug use: Not on file    Sexual activity: Not on file     Review of Systems   Constitutional:  Positive for activity change and appetite change. Negative for diaphoresis and fever.   HENT:  Negative for ear pain, postnasal drip and trouble swallowing.    Eyes:  Negative for visual disturbance.   Respiratory:  Negative for cough, chest tightness, shortness of breath and wheezing.    Cardiovascular:  Negative for chest pain, palpitations and leg swelling.   Gastrointestinal:  Negative for abdominal pain, nausea and vomiting.   Genitourinary:  Negative for flank pain and hematuria.   Musculoskeletal:  Negative for arthralgias, back pain, neck pain and neck stiffness.   Skin:  Negative for wound.   Neurological:  Positive for weakness. Negative for seizures and syncope.   Hematological:  Negative for adenopathy.   Psychiatric/Behavioral:  Negative for agitation, behavioral problems and confusion.    Objective:     Vital Signs (Most Recent):  Temp: 97.4 °F (36.3 °C) (07/16/23 1900)  Pulse: 89 (07/16/23 1900)  Resp: 18 (07/16/23 1900)  BP: 134/80 (07/16/23 1900)  SpO2: 98 % (07/16/23 1900) Vital Signs (24h Range):  Temp:  [97.4 °F (36.3 °C)-98.3 °F (36.8 °C)] 97.4 °F (36.3 °C)  Pulse:  [85-89] 89  Resp:  [16-18] 18  SpO2:  [98 %-99 %] 98 %  BP: (103-134)/(53-80) 134/80        There is no height or weight on file to calculate BMI.     Physical Exam  Vitals and nursing note reviewed.   Constitutional:       General: He is not in acute distress.     Appearance: Normal appearance. He is not ill-appearing.   HENT:      Head: Normocephalic and atraumatic.      Right Ear: External ear normal.      Left Ear: External ear normal.      Nose: Nose normal. No congestion or rhinorrhea.   Eyes:      General:         Right eye: No discharge.          Left eye: No discharge.      Conjunctiva/sclera: Conjunctivae normal.   Cardiovascular:      Rate and Rhythm: Normal rate and regular rhythm.      Heart sounds: Normal heart sounds.   Pulmonary:      Effort: Pulmonary effort is normal.      Breath sounds: Normal breath sounds. No wheezing or rhonchi.   Abdominal:      Palpations: Abdomen is soft.      Tenderness: There is no guarding or rebound.   Musculoskeletal:      Cervical back: Normal range of motion and neck supple.      Left lower leg: No edema.      Comments: R BKA noted    Skin:     General: Skin is warm.   Neurological:      Mental Status: He is alert and oriented to person, place, and time.      Motor: Weakness present.      Comments: L arm weakness strength of 4/5   Psychiatric:         Mood and Affect: Mood normal.         Behavior: Behavior normal.         Thought Content: Thought content normal.         Judgment: Judgment normal.              Significant Labs: All pertinent labs within the past 24 hours have been reviewed.  Recent Lab Results         07/16/23  2101        Albumin/Globulin Ratio 0.4       Albumin 1.8       Alkaline Phosphatase 106       ALT 10       Anion Gap 17       aPTT 54.9       AST 14       Baso # 0.03       Basophil % 0.4       BILIRUBIN TOTAL 0.3       BUN 56       BUN/CREAT RATIO 5       Calcium 7.2       Chloride 101       CO2 23       Creatinine 10.49       Differential Type Auto       eGFR 6       Eos # 0.12       Eosinophil % 1.6       Ferritin 519       Folate 14.3       Globulin, Total 4.8       Glucose 87       Group & Rh O POS       Hematocrit 26.1       Hemoglobin 9.0       Immature Grans (Abs) 0.13       Immature Granulocytes 1.8       INDIRECT JOB NEG       INR 1.17       Iron 53       Iron Saturation 40       Lymph # 1.91       Lymph % 25.8       Magnesium 1.7       MCH 29.9       MCHC 34.5       MCV 86.7       Mono # 0.45       Mono % 6.1       MPV 7.9       Neutrophils, Abs 4.76       Neutrophils  Relative 64.3       nRBC 0.0       NUCLEATED RBC ABSOLUTE 0.00       Phosphorus 8.3       Platelets 449       Potassium 3.2       PROTEIN TOTAL 6.6       Protime 14.8       RBC 3.01       RDW 13.7       Sodium 138       Specimen Outdate 07/19/2023 23:59       TIBC 132       TSH 1.810       WBC 7.40               Significant Imaging: I have reviewed all pertinent imaging results/findings within the past 24 hours.  I have reviewed and interpreted all pertinent imaging results/findings within the past 24 hours.    Assessment/Plan:     * ESRD (end stage renal disease)  - CR 10.9 and GFR 6 unknown current recent baseline but Cr was 1.4 in March 2021  - General surgery consulted to start HD dialysis, thanks for the assistance  - Nephrology consulted for dialysis, thanks for the assistance  - Avoid nephrotoxic drugs  - Renally dose applicable medications        Anemia of chronic disease  - H&H 9.0/26.1 with MCV 86.1 and Iron studies with Iron 53, TIBC 132 and Ferritin 519  - Anemia secondary to chronic renal disease  - Holding home Ferrous Sulfate 325 mg BID for now  - Continue to monitor H&H and transfuse as indicated  - Nephrology consulted, thanks for the assitance      Hypertension  - Blood pressure is currently controlled  - Continue home Coreg 3.125 mg BID and Hydralazine 10 mg TID  - Holding the other home blood pressure medications  - Continue to monitor blood pressure and adjust BP medications as indicated      Type 2 diabetes mellitus with renal complication  - Pending A1c  - Hold Oral hypoglycemics while patient is in the hospital.  - Nephrology consulted for dialysis  - Will continue to monitor blood glucose in the setting of ESRD    HX of Left-sided cerebrovascular accident (CVA)  - Holding home Plavix for possible surgical intervention for dialysis  - Continue home Coreg  - Replace home Crestor to Lipitor while in hospital      GERD (gastroesophageal reflux disease)  Holding home PPI      VTE Risk Mitigation  (From admission, onward)         Ordered     Place sequential compression device  Until discontinued         07/17/23 0002                           Elroy Perez MD  Department of Hospital Medicine  [facility]   High Dose Vitamin A Pregnancy And Lactation Text: High dose vitamin A therapy is contraindicated during pregnancy and breast feeding.

## 2023-07-17 NOTE — SUBJECTIVE & OBJECTIVE
Past Medical History:   Diagnosis Date    Chronic kidney disease (CKD), stage V     Coronary artery disease     Diabetes mellitus     Hypertension     Stroke        Past Surgical History:   Procedure Laterality Date    AMPUTATION, LOWER LIMB Right        Review of patient's allergies indicates:  Not on File  Current Facility-Administered Medications   Medication Frequency    0.9%  NaCl infusion PRN    acetaminophen tablet 1,000 mg Q6H PRN    atorvastatin tablet 40 mg QHS    bisacodyL EC tablet 10 mg Daily PRN    carvediloL tablet 3.125 mg BID    dextromethorphan-guaiFENesin  mg/5 ml liquid 10 mL Q6H PRN    dextrose 10% bolus 125 mL 125 mL PRN    dextrose 10% bolus 250 mL 250 mL PRN    glucagon (human recombinant) injection 1 mg PRN    glucose chewable tablet 16 g PRN    glucose chewable tablet 24 g PRN    heparin (porcine) injection 4,000 Units PRN    hydrALAZINE injection 10 mg Q6H PRN    hydrALAZINE tablet 10 mg Q8H    insulin aspart U-100 injection 0-5 Units QID (AC + HS) PRN    mupirocin 2 % ointment BID    ondansetron injection 8 mg Q6H PRN    simethicone chewable tablet 80 mg TID PRN    tamsulosin 24 hr capsule 0.4 mg Daily    traZODone tablet 50 mg Nightly PRN     Family History       Problem Relation (Age of Onset)    Diabetes Mother, Father    Hypertension Mother, Father, Sister, Brother          Tobacco Use    Smoking status: Unknown    Smokeless tobacco: Not on file   Substance and Sexual Activity    Alcohol use: Not on file    Drug use: Not on file    Sexual activity: Not on file     Review of Systems   All other systems reviewed and are negative.  Objective:     Vital Signs (Most Recent):  Temp: 98 °F (36.7 °C) (07/17/23 1635)  Pulse: 82 (07/17/23 1635)  Resp: 18 (07/17/23 1635)  BP: 110/75 (07/17/23 1635)  SpO2: 98 % (07/17/23 1425) Vital Signs (24h Range):  Temp:  [97.4 °F (36.3 °C)-99.3 °F (37.4 °C)] 98 °F (36.7 °C)  Pulse:  [73-89] 82  Resp:  [9-20] 18  SpO2:  [98 %-100 %] 98 %  BP:  ()/() 110/75        There is no height or weight on file to calculate BMI.  There is no height or weight on file to calculate BSA.    No intake/output data recorded.     Physical Exam  Vitals reviewed.   HENT:      Head: Normocephalic and atraumatic.      Mouth/Throat:      Mouth: Mucous membranes are moist.   Eyes:      Pupils: Pupils are equal, round, and reactive to light.   Cardiovascular:      Rate and Rhythm: Regular rhythm.      Heart sounds: Normal heart sounds.   Pulmonary:      Effort: Pulmonary effort is normal.   Abdominal:      Palpations: Abdomen is soft.   Neurological:      Mental Status: He is alert.   Psychiatric:         Mood and Affect: Mood normal.        Significant Labs:  BMP:   Recent Labs   Lab 07/16/23 2101   GLU 87      K 3.2*      CO2 23   BUN 56*   CREATININE 10.49*   CALCIUM 7.2*   MG 1.7     CBC:   Recent Labs   Lab 07/16/23 2101   WBC 7.40   RBC 3.01*   HGB 9.0*   HCT 26.1*   *   MCV 86.7   MCH 29.9   MCHC 34.5       Significant Imaging:  Labs: Reviewed

## 2023-07-17 NOTE — SUBJECTIVE & OBJECTIVE
No current facility-administered medications on file prior to encounter.     Current Outpatient Medications on File Prior to Encounter   Medication Sig    carvediloL (COREG) 3.125 MG tablet Take 3.125 mg by mouth 2 (two) times daily.    clopidogreL (PLAVIX) 75 mg tablet Take 75 mg by mouth once daily.    ferrous sulfate 325 (65 FE) MG EC tablet Take 325 mg by mouth 2 (two) times daily.    furosemide (LASIX) 80 MG tablet Take 80 mg by mouth 2 (two) times daily as needed.    glipiZIDE (GLUCOTROL) 2.5 MG TR24 Take 2.5 mg by mouth daily with breakfast.    hydrALAZINE (APRESOLINE) 10 MG tablet Take 10 mg by mouth every 8 (eight) hours.    isosorbide mononitrate (IMDUR) 30 MG 24 hr tablet Take 30 mg by mouth once daily. Take half Tab daily    NIFEdipine (ADALAT CC) 30 MG TbSR Take 30 mg by mouth once daily.    omeprazole (PRILOSEC) 20 MG capsule Take 20 mg by mouth once daily.    rosuvastatin (CRESTOR) 40 MG Tab Take 40 mg by mouth every evening.    sodium bicarbonate 650 MG tablet Take 650 mg by mouth 3 (three) times daily. Take 2 Tab  three times a day    tamsulosin (FLOMAX) 0.4 mg Cap Take 0.4 mg by mouth once daily.       Review of patient's allergies indicates:  Not on File    Past Medical History:   Diagnosis Date    Chronic kidney disease (CKD), stage V     Coronary artery disease     Diabetes mellitus     Hypertension     Stroke      Past Surgical History:   Procedure Laterality Date    AMPUTATION, LOWER LIMB Right      Family History       Problem Relation (Age of Onset)    Diabetes Mother, Father    Hypertension Mother, Father, Sister, Brother          Tobacco Use    Smoking status: Unknown    Smokeless tobacco: Not on file   Substance and Sexual Activity    Alcohol use: Not on file    Drug use: Not on file    Sexual activity: Not on file     Review of Systems   Constitutional: Negative.  Negative for appetite change, chills, fever and unexpected weight change.   HENT: Negative.  Negative for trouble swallowing.     Eyes: Negative.    Respiratory: Negative.  Negative for chest tightness and shortness of breath.    Cardiovascular: Negative.  Negative for chest pain.   Gastrointestinal: Negative.  Negative for abdominal distention, abdominal pain, blood in stool and nausea.   Endocrine: Negative.    Genitourinary: Negative.  Negative for hematuria.   Musculoskeletal: Negative.  Negative for back pain and myalgias.   Skin: Negative.  Negative for color change.   Allergic/Immunologic: Negative.    Neurological: Negative.  Negative for dizziness, syncope, weakness and light-headedness.   Psychiatric/Behavioral: Negative.  Negative for agitation.    Objective:     Vital Signs (Most Recent):  Temp: 98.2 °F (36.8 °C) (07/17/23 0400)  Pulse: 79 (07/17/23 0400)  Resp: 16 (07/17/23 0400)  BP: (!) 165/98 (07/17/23 0400)  SpO2: 99 % (07/17/23 0400) Vital Signs (24h Range):  Temp:  [97.4 °F (36.3 °C)-98.3 °F (36.8 °C)] 98.2 °F (36.8 °C)  Pulse:  [79-89] 79  Resp:  [16-18] 16  SpO2:  [98 %-99 %] 99 %  BP: (103-165)/(53-99) 165/98        There is no height or weight on file to calculate BMI.     Physical Exam  Constitutional:       General: He is not in acute distress.     Appearance: Normal appearance.   HENT:      Head: Normocephalic.   Cardiovascular:      Rate and Rhythm: Normal rate.   Pulmonary:      Effort: Pulmonary effort is normal. No respiratory distress.   Abdominal:      General: There is no distension.      Tenderness: There is no abdominal tenderness.   Musculoskeletal:         General: Normal range of motion.      Right Lower Extremity: Right leg is amputated below knee.   Skin:     General: Skin is warm.      Coloration: Skin is not jaundiced.   Neurological:      General: No focal deficit present.      Mental Status: He is alert and oriented to person, place, and time.      Cranial Nerves: No cranial nerve deficit.          I have reviewed all pertinent lab results within the past 24 hours.  CBC:   Recent Labs   Lab  07/16/23 2101   WBC 7.40   RBC 3.01*   HGB 9.0*   HCT 26.1*   *   MCV 86.7   MCH 29.9   MCHC 34.5     BMP:   Recent Labs   Lab 07/16/23 2101   GLU 87      K 3.2*      CO2 23   BUN 56*   CREATININE 10.49*   CALCIUM 7.2*   MG 1.7       Significant Diagnostics:  I have reviewed all pertinent imaging results/findings within the past 24 hours.

## 2023-07-17 NOTE — ASSESSMENT & PLAN NOTE
- H&H 9.0/26.1 with MCV 86.1 and Iron studies with Iron 53, TIBC 132 and Ferritin 519  - Anemia secondary to chronic renal disease  - Holding home Ferrous Sulfate 325 mg BID for now  - Continue to monitor H&H and transfuse as indicated  - Nephrology consulted, thanks for the assitance

## 2023-07-17 NOTE — CONSULTS
[facility]  Nephrology  Consult Note    Patient Name: Johnathan Patino  MRN: [id number]  Admission Date: 7/16/2023  Hospital Length of Stay: 1 days  Attending Provider: Dimitri Myers MD   Primary Care Physician: Primary Doctor No  Principal Problem:ESRD (end stage renal disease)    Consults  Subjective:     HPI: This patient with history of DM, HTN and CKD who has been followed by  Dr. Turner.  The patient has had continued progression of renal dysfunction.  He has agreed to start dialysis.      Past Medical History:   Diagnosis Date    Chronic kidney disease (CKD), stage V     Coronary artery disease     Diabetes mellitus     Hypertension     Stroke        Past Surgical History:   Procedure Laterality Date    AMPUTATION, LOWER LIMB Right        Review of patient's allergies indicates:  Not on File  Current Facility-Administered Medications   Medication Frequency    0.9%  NaCl infusion PRN    acetaminophen tablet 1,000 mg Q6H PRN    atorvastatin tablet 40 mg QHS    bisacodyL EC tablet 10 mg Daily PRN    carvediloL tablet 3.125 mg BID    dextromethorphan-guaiFENesin  mg/5 ml liquid 10 mL Q6H PRN    dextrose 10% bolus 125 mL 125 mL PRN    dextrose 10% bolus 250 mL 250 mL PRN    glucagon (human recombinant) injection 1 mg PRN    glucose chewable tablet 16 g PRN    glucose chewable tablet 24 g PRN    heparin (porcine) injection 4,000 Units PRN    hydrALAZINE injection 10 mg Q6H PRN    hydrALAZINE tablet 10 mg Q8H    insulin aspart U-100 injection 0-5 Units QID (AC + HS) PRN    mupirocin 2 % ointment BID    ondansetron injection 8 mg Q6H PRN    simethicone chewable tablet 80 mg TID PRN    tamsulosin 24 hr capsule 0.4 mg Daily    traZODone tablet 50 mg Nightly PRN     Family History       Problem Relation (Age of Onset)    Diabetes Mother, Father    Hypertension Mother, Father, Sister, Brother          Tobacco Use    Smoking status: Unknown    Smokeless tobacco: Not on  file   Substance and Sexual Activity    Alcohol use: Not on file    Drug use: Not on file    Sexual activity: Not on file     Review of Systems   All other systems reviewed and are negative.  Objective:     Vital Signs (Most Recent):  Temp: 98 °F (36.7 °C) (07/17/23 1635)  Pulse: 82 (07/17/23 1635)  Resp: 18 (07/17/23 1635)  BP: 110/75 (07/17/23 1635)  SpO2: 98 % (07/17/23 1425) Vital Signs (24h Range):  Temp:  [97.4 °F (36.3 °C)-99.3 °F (37.4 °C)] 98 °F (36.7 °C)  Pulse:  [73-89] 82  Resp:  [9-20] 18  SpO2:  [98 %-100 %] 98 %  BP: ()/() 110/75        There is no height or weight on file to calculate BMI.  There is no height or weight on file to calculate BSA.    No intake/output data recorded.     Physical Exam  Vitals reviewed.   HENT:      Head: Normocephalic and atraumatic.      Mouth/Throat:      Mouth: Mucous membranes are moist.   Eyes:      Pupils: Pupils are equal, round, and reactive to light.   Cardiovascular:      Rate and Rhythm: Regular rhythm.      Heart sounds: Normal heart sounds.   Pulmonary:      Effort: Pulmonary effort is normal.   Abdominal:      Palpations: Abdomen is soft.   Neurological:      Mental Status: He is alert.   Psychiatric:         Mood and Affect: Mood normal.        Significant Labs:  BMP:   Recent Labs   Lab 07/16/23  2101   GLU 87      K 3.2*      CO2 23   BUN 56*   CREATININE 10.49*   CALCIUM 7.2*   MG 1.7     CBC:   Recent Labs   Lab 07/16/23  2101   WBC 7.40   RBC 3.01*   HGB 9.0*   HCT 26.1*   *   MCV 86.7   MCH 29.9   MCHC 34.5       Significant Imaging:  Labs: Reviewed    Assessment/Plan:     Renal/  * ESRD (end stage renal disease)  This patient with ESRD.  Starting dialysis today.    Oncology  Anemia of chronic disease  Chronic condition.    Endocrine  Type 2 diabetes mellitus with renal complication  Chronic condition        Thank you for your consult. I will follow-up with patient. Please contact us if you have any additional  questions.    Sancho Davis Jr, MD  Nephrology  [facility]

## 2023-07-17 NOTE — PT/OT/SLP PROGRESS
Patient Name:  Johnathan Patino   MRN:  [id number]     Patient not seen today secondary to Off the floor for procedure/surgery (pt gone for placement of dialysis catheter). Will follow-up 7/18/23.     7/17/2023

## 2023-07-17 NOTE — ASSESSMENT & PLAN NOTE
- Blood pressure is currently controlled  - Continue home Coreg 3.125 mg BID and Hydralazine 10 mg TID  - Holding the other home blood pressure medications  - Continue to monitor blood pressure and adjust BP medications as indicated

## 2023-07-17 NOTE — SUBJECTIVE & OBJECTIVE
Past Medical History:   Diagnosis Date    Chronic kidney disease (CKD), stage V     Coronary artery disease     Diabetes mellitus     Hypertension     Stroke        Past Surgical History:   Procedure Laterality Date    AMPUTATION, LOWER LIMB Right        Review of patient's allergies indicates:  Not on File    No current facility-administered medications on file prior to encounter.     Current Outpatient Medications on File Prior to Encounter   Medication Sig    carvediloL (COREG) 3.125 MG tablet Take 3.125 mg by mouth 2 (two) times daily.    clopidogreL (PLAVIX) 75 mg tablet Take 75 mg by mouth once daily.    ferrous sulfate 325 (65 FE) MG EC tablet Take 325 mg by mouth 2 (two) times daily.    furosemide (LASIX) 80 MG tablet Take 80 mg by mouth 2 (two) times daily as needed.    glipiZIDE (GLUCOTROL) 2.5 MG TR24 Take 2.5 mg by mouth daily with breakfast.    hydrALAZINE (APRESOLINE) 10 MG tablet Take 10 mg by mouth every 8 (eight) hours.    isosorbide mononitrate (IMDUR) 30 MG 24 hr tablet Take 30 mg by mouth once daily. Take half Tab daily    NIFEdipine (ADALAT CC) 30 MG TbSR Take 30 mg by mouth once daily.    omeprazole (PRILOSEC) 20 MG capsule Take 20 mg by mouth once daily.    rosuvastatin (CRESTOR) 40 MG Tab Take 40 mg by mouth every evening.    sodium bicarbonate 650 MG tablet Take 650 mg by mouth 3 (three) times daily. Take 2 Tab  three times a day    tamsulosin (FLOMAX) 0.4 mg Cap Take 0.4 mg by mouth once daily.     Family History       Problem Relation (Age of Onset)    Diabetes Mother, Father    Hypertension Mother, Father, Sister, Brother          Tobacco Use    Smoking status: Unknown    Smokeless tobacco: Not on file   Substance and Sexual Activity    Alcohol use: Not on file    Drug use: Not on file    Sexual activity: Not on file     Review of Systems   Constitutional:  Positive for activity change and appetite change. Negative for diaphoresis and fever.   HENT:  Negative for ear pain, postnasal drip  and trouble swallowing.    Eyes:  Negative for visual disturbance.   Respiratory:  Negative for cough, chest tightness, shortness of breath and wheezing.    Cardiovascular:  Negative for chest pain, palpitations and leg swelling.   Gastrointestinal:  Negative for abdominal pain, nausea and vomiting.   Genitourinary:  Negative for flank pain and hematuria.   Musculoskeletal:  Negative for arthralgias, back pain, neck pain and neck stiffness.   Skin:  Negative for wound.   Neurological:  Positive for weakness. Negative for seizures and syncope.   Hematological:  Negative for adenopathy.   Psychiatric/Behavioral:  Negative for agitation, behavioral problems and confusion.    Objective:     Vital Signs (Most Recent):  Temp: 97.4 °F (36.3 °C) (07/16/23 1900)  Pulse: 89 (07/16/23 1900)  Resp: 18 (07/16/23 1900)  BP: 134/80 (07/16/23 1900)  SpO2: 98 % (07/16/23 1900) Vital Signs (24h Range):  Temp:  [97.4 °F (36.3 °C)-98.3 °F (36.8 °C)] 97.4 °F (36.3 °C)  Pulse:  [85-89] 89  Resp:  [16-18] 18  SpO2:  [98 %-99 %] 98 %  BP: (103-134)/(53-80) 134/80        There is no height or weight on file to calculate BMI.     Physical Exam  Vitals and nursing note reviewed.   Constitutional:       General: He is not in acute distress.     Appearance: Normal appearance. He is not ill-appearing.   HENT:      Head: Normocephalic and atraumatic.      Right Ear: External ear normal.      Left Ear: External ear normal.      Nose: Nose normal. No congestion or rhinorrhea.   Eyes:      General:         Right eye: No discharge.         Left eye: No discharge.      Conjunctiva/sclera: Conjunctivae normal.   Cardiovascular:      Rate and Rhythm: Normal rate and regular rhythm.      Heart sounds: Normal heart sounds.   Pulmonary:      Effort: Pulmonary effort is normal.      Breath sounds: Normal breath sounds. No wheezing or rhonchi.   Abdominal:      Palpations: Abdomen is soft.      Tenderness: There is no guarding or rebound.   Musculoskeletal:       Cervical back: Normal range of motion and neck supple.      Left lower leg: No edema.      Comments: R BARBARA noted    Skin:     General: Skin is warm.   Neurological:      Mental Status: He is alert and oriented to person, place, and time.      Motor: Weakness present.      Comments: L arm weakness strength of 4/5   Psychiatric:         Mood and Affect: Mood normal.         Behavior: Behavior normal.         Thought Content: Thought content normal.         Judgment: Judgment normal.              Significant Labs: All pertinent labs within the past 24 hours have been reviewed.  Recent Lab Results         07/16/23 2101        Albumin/Globulin Ratio 0.4       Albumin 1.8       Alkaline Phosphatase 106       ALT 10       Anion Gap 17       aPTT 54.9       AST 14       Baso # 0.03       Basophil % 0.4       BILIRUBIN TOTAL 0.3       BUN 56       BUN/CREAT RATIO 5       Calcium 7.2       Chloride 101       CO2 23       Creatinine 10.49       Differential Type Auto       eGFR 6       Eos # 0.12       Eosinophil % 1.6       Ferritin 519       Folate 14.3       Globulin, Total 4.8       Glucose 87       Group & Rh O POS       Hematocrit 26.1       Hemoglobin 9.0       Immature Grans (Abs) 0.13       Immature Granulocytes 1.8       INDIRECT JOB NEG       INR 1.17       Iron 53       Iron Saturation 40       Lymph # 1.91       Lymph % 25.8       Magnesium 1.7       MCH 29.9       MCHC 34.5       MCV 86.7       Mono # 0.45       Mono % 6.1       MPV 7.9       Neutrophils, Abs 4.76       Neutrophils Relative 64.3       nRBC 0.0       NUCLEATED RBC ABSOLUTE 0.00       Phosphorus 8.3       Platelets 449       Potassium 3.2       PROTEIN TOTAL 6.6       Protime 14.8       RBC 3.01       RDW 13.7       Sodium 138       Specimen Outdate 07/19/2023 23:59       TIBC 132       TSH 1.810       WBC 7.40               Significant Imaging: I have reviewed all pertinent imaging results/findings within the past 24 hours.  I have  reviewed and interpreted all pertinent imaging results/findings within the past 24 hours.

## 2023-07-17 NOTE — DIALYSIS ROUNDING
Nephrology Department            NAME: Johnathan Patino   YOB: 1980  MRN: [id number]  NOTE DATE: 07/17/2023       Seen in Dialysis Unit. The patient is tolerating the procedure.    Patient complains:  None.      REVIEW OF SYSTEMS:  he reports no shortness of breath, nausea or vomiting. No acute changes.    VITALS:  temperature is 98 °F (36.7 °C). His blood pressure is 110/75 and his pulse is 82. His respiration is 18 and oxygen saturation is 98%.  Hemodialysis documentation flowsheet reviewed with dialysis nurse, including weight and vitals.    Resting comfortably, NAD.  Respiration unlabored. Lungs clear.  Heart regular, no rub.  Abdomen soft, nontender, positive bowl sounds  No edema.    Recent Labs   Lab 07/16/23  2101      K 3.2*      CO2 23   BUN 56*   CREATININE 10.49*   CALCIUM 7.2*   PHOS 8.3*     Recent Labs   Lab 07/16/23  2101   WBC 7.40   HGB 9.0*   HCT 26.1*   *       ASSESSMENT/PLAN:  Continue with scheduled hemodialysis for this patient.    Sancho Davis Jr, MD

## 2023-07-17 NOTE — HOSPITAL COURSE
Patient presented to [facility] ED for renal failure needing dialysis. He had complaints of fatigue, decreased appetite and weakness for several days. During the hospitalization, the patient underwent insertion of a tunneled dialysis catheter in the RT internal jugular vein and underwent dialysis for 3 days in a row. The patient was set up with dialysis at [facility] in [place] for T/THUR/SAT. Vein mapping was completed for outpatient fistula procedure with Dr. Turner.     The patient met maximum benefit of hospitalization and was discharged with follow-up with his cardiologist (Dr. Armstrong), his PCP (Justa Vasquez), and nephrology (Dr. Turner).   --------------------------    7/17/2023: Dr. Connor inserted tunneled dialysis catheter in the RT internal jugular vein today. Vein mapping also ordered to evaluate bilateral upper extremities to schedule AV fistula as an outpatient. SS service consult placed for setting dialysis up outpatient. Nephrology consulted - Dr. Davis. Patient is known to Dr. Turner. 2 weeks ago, the patient went for insertion of a dialysis catheter but his BP was too high off of his meds and the procedure was deferred at [facility].     7/18/2023: Patient states that he is doing well. There is some soreness at the tunneled dialysis catheter site. Site looks clean. Patient underwent dialysis yesterday and 1L of fluid was taken off. Vein mapping was completed today. No overnight events.     7/19/2023: Patient underwent dialysis today. Patient's renal function has improved. The patient has been set up for dialysis at [facility] for Tuesday/Thursday/Saturday. Stopped Glipizide for diabetes due to ESRD. Started the patient on Januvia 25mg daily. Patient was determined to be Vitamin D deficient. Prescribed Vitamin D for Vitamin D deficiency. Patient is to follow-up with his cardiologist (Dr. Armstrong), and his PCP Justa Vasquez.

## 2023-07-17 NOTE — ANESTHESIA PREPROCEDURE EVALUATION
07/17/2023  Johnathan Patino is a 43 y.o., male.      Pre-op Assessment    I have reviewed the Patient Summary Reports.     I have reviewed the Nursing Notes. I have reviewed the NPO Status.   I have reviewed the Medications.     Review of Systems  Anesthesia Hx:  No problems with previous Anesthesia  Denies Family Hx of Anesthesia complications.   Denies Personal Hx of Anesthesia complications.   Social:  Non-Smoker, No Alcohol Use    Cardiovascular:   Hypertension CAD   hyperlipidemia ECG has been reviewed.    Renal/:   Chronic Renal Disease, CKD, ESRD CKD 5 - patient being prepared for HD   Hepatic/GI:   GERD    Neurological:   CVA, residual symptoms    Endocrine:   Diabetes, poorly controlled, type 2        Physical Exam  General: Well nourished, Cooperative and Alert    Airway:  Mallampati: II   Mouth Opening: Normal  TM Distance: Normal  Tongue: Normal  Neck ROM: Normal ROM    Chest/Lungs:  Clear to auscultation, Normal Respiratory Rate    Heart:  Rate: Normal  Rhythm: Regular Rhythm        Chemistry        Component Value Date/Time     07/16/2023 2101    K 3.2 (L) 07/16/2023 2101     07/16/2023 2101    CO2 23 07/16/2023 2101    BUN 56 (H) 07/16/2023 2101    CREATININE 10.49 (H) 07/16/2023 2101    GLU 87 07/16/2023 2101        Component Value Date/Time    CALCIUM 7.2 (L) 07/16/2023 2101    ALKPHOS 106 07/16/2023 2101    AST 14 (L) 07/16/2023 2101    ALT 10 (L) 07/16/2023 2101    BILITOT 0.3 07/16/2023 2101        Lab Results   Component Value Date    WBC 7.40 07/16/2023    HGB 9.0 (L) 07/16/2023    HCT 26.1 (L) 07/16/2023     (H) 07/16/2023         Anesthesia Plan  Type of Anesthesia, risks & benefits discussed:    Anesthesia Type: Gen Supraglottic Airway  Intra-op Monitoring Plan: Standard ASA Monitors  Post Op Pain Control Plan: multimodal analgesia  Induction:  IV  Airway Plan:  Direct, Post-Induction  Informed Consent: Informed consent signed with the Patient and all parties understand the risks and agree with anesthesia plan.  All questions answered.   ASA Score: 4  Day of Surgery Review of History & Physical: H&P Update referred to the surgeon/provider.I have interviewed and examined the patient. I have reviewed the patient's H&P dated: There are no significant changes.     Ready For Surgery From Anesthesia Perspective.     .

## 2023-07-17 NOTE — ASSESSMENT & PLAN NOTE
- Pending A1c  - Hold Oral hypoglycemics while patient is in the hospital.  - Nephrology consulted for dialysis  - Will continue to monitor blood glucose in the setting of ESRD

## 2023-07-17 NOTE — ASSESSMENT & PLAN NOTE
- CR 10.9 and GFR 6 unknown current recent baseline but Cr was 1.4 in March 2021  - General surgery consulted to start HD dialysis, thanks for the assistance  - Nephrology consulted for dialysis, thanks for the assistance  - Avoid nephrotoxic drugs  - Renally dose applicable medications

## 2023-07-17 NOTE — SUBJECTIVE & OBJECTIVE
Interval History: Dr. Connor inserted tunneled dialysis catheter in the RT internal jugular vein today. Vein mapping also ordered to evaluate bilateral upper extremities to schedule AV fistula as an outpatient. SS service consult placed for setting dialysis up outpatient. Nephrology consulted - Dr. Davis. Patient is known to Dr. Turner. 2 weeks ago, the patient went for insertion of a dialysis catheter but his BP was too high off of his meds and the procedure was deferred at [facility].     Review of Systems   Constitutional:  Positive for activity change, appetite change and fatigue. Negative for diaphoresis and fever.   HENT:  Negative for ear pain, postnasal drip and trouble swallowing.    Eyes:  Negative for visual disturbance.   Respiratory:  Negative for cough, chest tightness, shortness of breath and wheezing.    Cardiovascular:  Negative for chest pain, palpitations and leg swelling.   Gastrointestinal:  Negative for abdominal pain and vomiting.   Genitourinary:  Negative for flank pain and hematuria.   Musculoskeletal:  Negative for arthralgias, back pain, neck pain and neck stiffness.   Skin:  Negative for wound.   Neurological:  Positive for weakness. Negative for seizures and syncope.   Hematological:  Negative for adenopathy.   Psychiatric/Behavioral:  Negative for agitation, behavioral problems and confusion.    Objective:     Vital Signs (Most Recent):  Temp: 99.3 °F (37.4 °C) (07/17/23 1337)  Pulse: 80 (07/17/23 1410)  Resp: 15 (07/17/23 1410)  BP: (!) 149/88 (07/17/23 1410)  SpO2: 99 % (07/17/23 1410) Vital Signs (24h Range):  Temp:  [97.4 °F (36.3 °C)-99.3 °F (37.4 °C)] 99.3 °F (37.4 °C)  Pulse:  [73-89] 80  Resp:  [9-20] 15  SpO2:  [98 %-100 %] 99 %  BP: (103-166)/(53-99) 149/88        There is no height or weight on file to calculate BMI.    Intake/Output Summary (Last 24 hours) at 7/17/2023 1411  Last data filed at 7/17/2023 1315  Gross per 24 hour   Intake 100 ml   Output 5 ml   Net 95  ml         Physical Exam  Vitals and nursing note reviewed.   Constitutional:       General: He is not in acute distress.     Appearance: Normal appearance. He is not ill-appearing.   HENT:      Head: Normocephalic and atraumatic.      Right Ear: External ear normal.      Left Ear: External ear normal.      Nose: Nose normal. No congestion or rhinorrhea.   Eyes:      General:         Right eye: No discharge.         Left eye: No discharge.      Conjunctiva/sclera: Conjunctivae normal.   Cardiovascular:      Rate and Rhythm: Normal rate and regular rhythm.      Heart sounds: Normal heart sounds.   Pulmonary:      Effort: Pulmonary effort is normal.      Breath sounds: Normal breath sounds. No wheezing or rhonchi.   Abdominal:      Palpations: Abdomen is soft.      Tenderness: There is no guarding or rebound.   Musculoskeletal:      Cervical back: Normal range of motion and neck supple.      Left lower leg: No edema.      Comments: CARMELA JIMENEZ noted    Skin:     General: Skin is warm.   Neurological:      Mental Status: He is alert and oriented to person, place, and time.      Sensory: Sensation is intact.      Motor: Weakness present.      Comments: RT arm weakness; +4 strength     LT arm +5 strength        Psychiatric:         Mood and Affect: Mood normal.         Behavior: Behavior normal.         Thought Content: Thought content normal.         Judgment: Judgment normal.           Significant Labs: All pertinent labs within the past 24 hours have been reviewed.    Significant Imaging: I have reviewed all pertinent imaging results/findings within the past 24 hours.

## 2023-07-17 NOTE — PROGRESS NOTES
[facility] Hospital Medicine  Progress Note    Patient Name: Johnathan Patino  MRN: [id number]  Patient Class: IP- Inpatient   Admission Date: 7/16/2023  Length of Stay: 1 days  Attending Physician: Dimitri Myers MD  Primary Care Provider: Primary Doctor No        Subjective:     Principal Problem:ESRD (end stage renal disease)        HPI:  Patient is a 44 yo male who presents to the hospital from [facility] for renal failure needing dialysis. Patient presented to the [facility] Emergency Department earlier today with a complaint of fatigue, decreased appetite and weakness for several days. He denied any associated chest pain, shortness of breath, palpitations, abdominal pain, nausea and vomit. He has history of chronic renal disease and follows with Dr. Turenr. According to his girlfriend who was present at the bedside, the patient was supposed to have an AV fistula to start dialysis, but this has not been done due to elevated blood pressure. Of note, the girlfriend noted that the patient went to the [facility] ED on Thursday, July 6th with a complaint of back pain and was treated with pain medication and muscle relaxer. He returned to the emergency department the next day on Friday, July 7th with a complaint of abdominal pain and was diagnosed with pancreatitis, for which he was prescribed oxycodone. Nevertheless, the patient noted that the fatigue, decreased appetite and weakness continues to worsen over the past few days which prompted him to return to the ED earlier today. 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     In [facility] ED, work up was significant for BUN/cR 57/10.4 ( as per medical review baseline CR was 1.41 in March 2021) and GFR 6, K 3.2, Liver enzyme normal, Na 137, H&H 6.0/18 with MCV 87. On arrival, the  patient was afebrile and vital signs stable. He complained of weakness, fatigue and decreased appetite, but had no other concerns or complaints. Patient was admitted for further evaluation and management for renal failure.           Overview/Hospital Course:  7/17/2023: Dr. Connor inserted tunneled dialysis catheter in the RT internal jugular vein today. Vein mapping also ordered to evaluate bilateral upper extremities to schedule AV fistula as an outpatient. SS service consult placed for setting dialysis up outpatient. Nephrology consulted - Dr. Davis. Patient is known to Dr. Turner. 2 weeks ago, the patient went for insertion of a dialysis catheter but his BP was too high off of his meds and the procedure was deferred at [facility].       Interval History: Dr. Connor inserted tunneled dialysis catheter in the RT internal jugular vein today. Vein mapping also ordered to evaluate bilateral upper extremities to schedule AV fistula as an outpatient. SS service consult placed for setting dialysis up outpatient. Nephrology consulted - Dr. Davis. Patient is known to Dr. Turner. 2 weeks ago, the patient went for insertion of a dialysis catheter but his BP was too high off of his meds and the procedure was deferred at [facility].     Review of Systems   Constitutional:  Positive for activity change, appetite change and fatigue. Negative for diaphoresis and fever.   HENT:  Negative for ear pain, postnasal drip and trouble swallowing.    Eyes:  Negative for visual disturbance.   Respiratory:  Negative for cough, chest tightness, shortness of breath and wheezing.    Cardiovascular:  Negative for chest pain, palpitations and leg swelling.   Gastrointestinal:  Negative for abdominal pain and vomiting.   Genitourinary:  Negative for flank pain and hematuria.   Musculoskeletal:  Negative for arthralgias, back pain, neck pain and neck stiffness.   Skin:  Negative for wound.   Neurological:  Positive for weakness.  Negative for seizures and syncope.   Hematological:  Negative for adenopathy.   Psychiatric/Behavioral:  Negative for agitation, behavioral problems and confusion.    Objective:     Vital Signs (Most Recent):  Temp: 99.3 °F (37.4 °C) (07/17/23 1337)  Pulse: 80 (07/17/23 1410)  Resp: 15 (07/17/23 1410)  BP: (!) 149/88 (07/17/23 1410)  SpO2: 99 % (07/17/23 1410) Vital Signs (24h Range):  Temp:  [97.4 °F (36.3 °C)-99.3 °F (37.4 °C)] 99.3 °F (37.4 °C)  Pulse:  [73-89] 80  Resp:  [9-20] 15  SpO2:  [98 %-100 %] 99 %  BP: (103-166)/(53-99) 149/88        There is no height or weight on file to calculate BMI.    Intake/Output Summary (Last 24 hours) at 7/17/2023 1411  Last data filed at 7/17/2023 1315  Gross per 24 hour   Intake 100 ml   Output 5 ml   Net 95 ml         Physical Exam  Vitals and nursing note reviewed.   Constitutional:       General: He is not in acute distress.     Appearance: Normal appearance. He is not ill-appearing.   HENT:      Head: Normocephalic and atraumatic.      Right Ear: External ear normal.      Left Ear: External ear normal.      Nose: Nose normal. No congestion or rhinorrhea.   Eyes:      General:         Right eye: No discharge.         Left eye: No discharge.      Conjunctiva/sclera: Conjunctivae normal.   Cardiovascular:      Rate and Rhythm: Normal rate and regular rhythm.      Heart sounds: Normal heart sounds.   Pulmonary:      Effort: Pulmonary effort is normal.      Breath sounds: Normal breath sounds. No wheezing or rhonchi.   Abdominal:      Palpations: Abdomen is soft.      Tenderness: There is no guarding or rebound.   Musculoskeletal:      Cervical back: Normal range of motion and neck supple.      Left lower leg: No edema.      Comments: R LATONIAA noted    Skin:     General: Skin is warm.   Neurological:      Mental Status: He is alert and oriented to person, place, and time.      Sensory: Sensation is intact.      Motor: Weakness present.      Comments: RT arm weakness; +4 strength      LT arm +5 strength        Psychiatric:         Mood and Affect: Mood normal.         Behavior: Behavior normal.         Thought Content: Thought content normal.         Judgment: Judgment normal.           Significant Labs: All pertinent labs within the past 24 hours have been reviewed.    Significant Imaging: I have reviewed all pertinent imaging results/findings within the past 24 hours.      Assessment/Plan:      * ESRD (end stage renal disease)  - CR 10.9 and GFR 6 unknown current recent baseline but Cr was 1.4 in March 2021  - General surgery consulted to start HD dialysis, thanks for the assistance  - Nephrology consulted for dialysis, thanks for the assistance  - Avoid nephrotoxic drugs  - Renally dose applicable medications    7/17/2023: To the OR for tunneled hemodialysis catheter insertion. Vein mapping to evaluate bilateral upper extremities to schedule AV fistula as an outpatient.  - Mg and P daily   - Vitamin D level pending     Anemia of chronic disease  - H&H 9.0/26.1 with MCV 86.1 and Iron studies with Iron 53, TIBC 132 and Ferritin 519  - Anemia secondary to chronic renal disease  - Holding home Ferrous Sulfate 325 mg BID for now  - Continue to monitor H&H and transfuse as indicated  - Nephrology consulted, thanks for the assitance    7/17/2023: H/H is stable at 9/26.1.     Hypertension  - Blood pressure is currently controlled  - Continue home Coreg 3.125 mg BID and Hydralazine 10 mg TID  - Holding the other home blood pressure medications  - Continue to monitor blood pressure and adjust BP medications as indicated    7/17/2023: Continue to monitor BP. Make adjustments to medications as necessary.     HX of Left-sided cerebrovascular accident (CVA)  - Holding home Plavix for possible surgical intervention for dialysis  - Continue home Coreg  - Replace home Crestor to Lipitor while in hospital      Type 2 diabetes mellitus with renal complication  - Pending A1c  - Hold Oral hypoglycemics while  patient is in the hospital.  - Nephrology consulted for dialysis  - Will continue to monitor blood glucose in the setting of ESRD    7/17/2023: POCT glucose QID. SSI. Will make adjustments as necessary     Uremia due to inadequate renal perfusion        GERD (gastroesophageal reflux disease)  Holding home PPI        VTE Risk Mitigation (From admission, onward)         Ordered     Place sequential compression device  Until discontinued         07/17/23 0002                Discharge Planning   ISAURA:      Code Status: Full Code   Is the patient medically ready for discharge?:     Reason for patient still in hospital (select all that apply): Treatment  Discharge Plan A: Home with family                  Jaimie Ayala MD  Department of Hospital Medicine   [facility]

## 2023-07-17 NOTE — ASSESSMENT & PLAN NOTE
- H&H 9.0/26.1 with MCV 86.1 and Iron studies with Iron 53, TIBC 132 and Ferritin 519  - Anemia secondary to chronic renal disease  - Holding home Ferrous Sulfate 325 mg BID for now  - Continue to monitor H&H and transfuse as indicated  - Nephrology consulted, thanks for the assitance    7/17/2023: H/H is stable at 9/26.1.

## 2023-07-17 NOTE — PLAN OF CARE
[facility]  Initial Discharge Assessment       Primary Care Provider: Primary Doctor No    Admission Diagnosis: Acute renal failure [N17.9]  Uremia due to inadequate renal perfusion [N19]    Admission Date: 7/16/2023  Expected Discharge Date:     Transition of Care Barriers: None    Payor: MEDICARE / Plan: MEDICARE PART A & B / Product Type: Government /     Extended Emergency Contact Information  Primary Emergency Contact: Jose De Jesus Patino  Mobile Phone: [phone]  Relation: Brother  Preferred language: English   needed? No    Discharge Plan A: Home with family  Discharge Plan B: Home with family      [facility] [place] [place] - [address]  [address] [facility] [address]  Phone: [phone] Fax: [phone]      Initial Assessment (most recent)       Adult Discharge Assessment - 07/17/23 1301          Discharge Assessment    Assessment Type Discharge Planning Assessment     Source of Information patient     Communicated ISAURA with patient/caregiver Date not available/Unable to determine     People in Home significant other     Do you expect to return to your current living situation? Yes     Do you have help at home or someone to help you manage your care at home? Yes     Who are your caregiver(s) and their phone number(s)? Jose De Jesus Patino (brother) [phone]     Prior to hospitilization cognitive status: Alert/Oriented     Current cognitive status: Alert/Oriented     Walking or Climbing Stairs ambulation difficulty, requires equipment     Home Accessibility wheelchair accessible     Home Layout Able to live on 1st floor     Equipment Currently Used at Home prosthesis;wheelchair;shower chair     Readmission within 30 days? No     Patient currently being followed by outpatient case management? No     Do you currently have service(s) that help you manage your care at home? No     Do you take prescription medications? Yes     Do you have prescription coverage?  Yes     Do you have any problems affording any of your prescribed medications? No     Is the patient taking medications as prescribed? yes     How do you get to doctors appointments? car, drives self     Are you on dialysis? No     Do you take coumadin? No     Discharge Plan A Home with family     Discharge Plan B Home with family     DME Needed Upon Discharge  none     Discharge Plan discussed with: Patient     Transition of Care Barriers None        Physical Activity    On average, how many days per week do you engage in moderate to strenuous exercise (like a brisk walk)? 0 days     On average, how many minutes do you engage in exercise at this level? 0 min        Financial Resource Strain    How hard is it for you to pay for the very basics like food, housing, medical care, and heating? Not hard at all        Housing Stability    In the last 12 months, was there a time when you were not able to pay the mortgage or rent on time? No     In the last 12 months, how many places have you lived? 1     In the last 12 months, was there a time when you did not have a steady place to sleep or slept in a shelter (including now)? No        Transportation Needs    In the past 12 months, has lack of transportation kept you from medical appointments or from getting medications? No     In the past 12 months, has lack of transportation kept you from meetings, work, or from getting things needed for daily living? No        Food Insecurity    Within the past 12 months, you worried that your food would run out before you got the money to buy more. Never true     Within the past 12 months, the food you bought just didn't last and you didn't have money to get more. Never true        Stress    Do you feel stress - tense, restless, nervous, or anxious, or unable to sleep at night because your mind is troubled all the time - these days? Not at all        Social Connections    In a typical week, how many times do you talk on the phone with  family, friends, or neighbors? More than three times a week     How often do you get together with friends or relatives? More than three times a week     How often do you attend Christianity or Alevism services? More than 4 times per year     Do you belong to any clubs or organizations such as Christianity groups, unions, fraternal or athletic groups, or school groups? Yes     How often do you attend meetings of the clubs or organizations you belong to? More than 4 times per year     Are you , , , , never , or living with a partner? Living with partner        Alcohol Use    Q1: How often do you have a drink containing alcohol? Never     Q2: How many drinks containing alcohol do you have on a typical day when you are drinking? Patient does not drink     Q3: How often do you have six or more drinks on one occasion? Never                   Patient lives at home with his significant other. He has a wheelchair, shower chair, and prosthesis for home use. No HH pta. Plans to return home at discharge. SDOH complete. SS received consult for outpatient dialysis. Initial information faxed [facility] at this time. SS following.

## 2023-07-17 NOTE — NURSING
Hd tx completed today. Patient is a new start. 1000 ml removed of fluid. New catheter placed to right chest wall and ran well for tx. No complications.

## 2023-07-17 NOTE — CONSULTS
[facility]  General Surgery  Consult Note    Patient Name: Johnathan Patino  MRN: [id number]  Code Status: Full Code  Admission Date: 7/16/2023  Hospital Length of Stay: 1 days  Attending Physician: Dimitri Myers MD  Primary Care Provider: Primary Doctor No    Patient information was obtained from patient and ER records.     Inpatient consult to General Surgery  Consult performed by: Domenic Connor DO  Consult ordered by: Domenic Connor DO        Subjective:     Principal Problem: ESRD (end stage renal disease)    History of Present Illness: 43-year-old Native-American male presents to the hospital for insertion of dialysis catheter.  Patient presented to outside facility for fatigue weakness.  History of chronic renal disease and was initially scheduled to have AV fistula placed by surgeon at [facility], but this never happened due to him having increased blood pressure.  Recent Er visit for pancreatitis.  Currently with acute on chronic renal failure in need of dialysis.  General surgery consulted.  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  Currently at bed resting, no complaints.  Denies any chest pain/shortness of breath, nausea/vomiting/diarrhea, fever/chills.      No current facility-administered medications on file prior to encounter.     Current Outpatient Medications on File Prior to Encounter   Medication Sig    carvediloL (COREG) 3.125 MG tablet Take 3.125 mg by mouth 2 (two) times daily.    clopidogreL (PLAVIX) 75 mg tablet Take 75 mg by mouth once daily.    ferrous sulfate 325 (65 FE) MG EC tablet Take 325 mg by mouth 2 (two) times daily.    furosemide (LASIX) 80 MG tablet Take 80 mg by mouth 2 (two) times daily as needed.    glipiZIDE (GLUCOTROL) 2.5 MG TR24 Take 2.5 mg by mouth daily with breakfast.    hydrALAZINE (APRESOLINE) 10 MG tablet Take 10 mg by mouth every 8 (eight)  hours.    isosorbide mononitrate (IMDUR) 30 MG 24 hr tablet Take 30 mg by mouth once daily. Take half Tab daily    NIFEdipine (ADALAT CC) 30 MG TbSR Take 30 mg by mouth once daily.    omeprazole (PRILOSEC) 20 MG capsule Take 20 mg by mouth once daily.    rosuvastatin (CRESTOR) 40 MG Tab Take 40 mg by mouth every evening.    sodium bicarbonate 650 MG tablet Take 650 mg by mouth 3 (three) times daily. Take 2 Tab  three times a day    tamsulosin (FLOMAX) 0.4 mg Cap Take 0.4 mg by mouth once daily.       Review of patient's allergies indicates:  Not on File    Past Medical History:   Diagnosis Date    Chronic kidney disease (CKD), stage V     Coronary artery disease     Diabetes mellitus     Hypertension     Stroke      Past Surgical History:   Procedure Laterality Date    AMPUTATION, LOWER LIMB Right      Family History       Problem Relation (Age of Onset)    Diabetes Mother, Father    Hypertension Mother, Father, Sister, Brother          Tobacco Use    Smoking status: Unknown    Smokeless tobacco: Not on file   Substance and Sexual Activity    Alcohol use: Not on file    Drug use: Not on file    Sexual activity: Not on file     Review of Systems   Constitutional: Negative.  Negative for appetite change, chills, fever and unexpected weight change.   HENT: Negative.  Negative for trouble swallowing.    Eyes: Negative.    Respiratory: Negative.  Negative for chest tightness and shortness of breath.    Cardiovascular: Negative.  Negative for chest pain.   Gastrointestinal: Negative.  Negative for abdominal distention, abdominal pain, blood in stool and nausea.   Endocrine: Negative.    Genitourinary: Negative.  Negative for hematuria.   Musculoskeletal: Negative.  Negative for back pain and myalgias.   Skin: Negative.  Negative for color change.   Allergic/Immunologic: Negative.    Neurological: Negative.  Negative for dizziness, syncope, weakness and light-headedness.   Psychiatric/Behavioral: Negative.   Negative for agitation.    Objective:     Vital Signs (Most Recent):  Temp: 98.2 °F (36.8 °C) (07/17/23 0400)  Pulse: 79 (07/17/23 0400)  Resp: 16 (07/17/23 0400)  BP: (!) 165/98 (07/17/23 0400)  SpO2: 99 % (07/17/23 0400) Vital Signs (24h Range):  Temp:  [97.4 °F (36.3 °C)-98.3 °F (36.8 °C)] 98.2 °F (36.8 °C)  Pulse:  [79-89] 79  Resp:  [16-18] 16  SpO2:  [98 %-99 %] 99 %  BP: (103-165)/(53-99) 165/98        There is no height or weight on file to calculate BMI.     Physical Exam  Constitutional:       General: He is not in acute distress.     Appearance: Normal appearance.   HENT:      Head: Normocephalic.   Cardiovascular:      Rate and Rhythm: Normal rate.   Pulmonary:      Effort: Pulmonary effort is normal. No respiratory distress.   Abdominal:      General: There is no distension.      Tenderness: There is no abdominal tenderness.   Musculoskeletal:         General: Normal range of motion.      Right Lower Extremity: Right leg is amputated below knee.   Skin:     General: Skin is warm.      Coloration: Skin is not jaundiced.   Neurological:      General: No focal deficit present.      Mental Status: He is alert and oriented to person, place, and time.      Cranial Nerves: No cranial nerve deficit.          I have reviewed all pertinent lab results within the past 24 hours.  CBC:   Recent Labs   Lab 07/16/23 2101   WBC 7.40   RBC 3.01*   HGB 9.0*   HCT 26.1*   *   MCV 86.7   MCH 29.9   MCHC 34.5     BMP:   Recent Labs   Lab 07/16/23 2101   GLU 87      K 3.2*      CO2 23   BUN 56*   CREATININE 10.49*   CALCIUM 7.2*   MG 1.7       Significant Diagnostics:  I have reviewed all pertinent imaging results/findings within the past 24 hours.      Assessment/Plan:     * ESRD (end stage renal disease)  To the OR for tunneled hemodialysis catheter insertion.      Risks and benefits explained with the patient including risks for infection, bleeding, injury to surrounding structures, hematoma/seroma  formation with need for possible evacuation, possible open.  The patient verbalized understanding, agrees and wishes to proceed with surgery.    We will also order vein mapping to evaluate bilateral upper extremities to schedule AV fistula as an outpatient.      VTE Risk Mitigation (From admission, onward)         Ordered     Place sequential compression device  Until discontinued         07/17/23 0002                Thank you for your consult. I will follow-up with patient. Please contact us if you have any additional questions.    Domenic Duggan, DO  General Surgery  [facility]

## 2023-07-17 NOTE — ASSESSMENT & PLAN NOTE
- Blood pressure is currently controlled  - Continue home Coreg 3.125 mg BID and Hydralazine 10 mg TID  - Holding the other home blood pressure medications  - Continue to monitor blood pressure and adjust BP medications as indicated    7/17/2023: Continue to monitor BP. Make adjustments to medications as necessary.

## 2023-07-17 NOTE — HPI
Patient is a 42 yo male who presents to the hospital from [facility] for renal failure needing dialysis. Patient presented to the [facility] Emergency Department earlier today with a complaint of fatigue, decreased appetite and weakness for several days. He denied any associated chest pain, shortness of breath, palpitations, abdominal pain, nausea and vomit. He has history of chronic renal disease and follows with Dr. Turner. According to his girlfriend who was present at the bedside, the patient was supposed to have an AV fistula to start dialysis, but this has not been done due to elevated blood pressure. Of note, the girlfriend noted that the patient went to the [facility] ED on Thursday, July 6th with a complaint of back pain and was treated with pain medication and muscle relaxer. He returned to the emergency department the next day on Friday, July 7th with a complaint of abdominal pain and was diagnosed with pancreatitis, for which he was prescribed oxycodone. Nevertheless, the patient noted that the fatigue, decreased appetite and weakness continues to worsen over the past few days which prompted him to return to the ED earlier today. 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     In [facility] ED, work up was significant for BUN/cR 57/10.4 ( as per medical review baseline CR was 1.41 in March 2021) and GFR 6, K 3.2, Liver enzyme normal, Na 137, H&H 6.0/18 with MCV 87. On arrival, the patient was afebrile and vital signs stable. He complained of weakness, fatigue and decreased appetite, but had no other concerns or complaints. Patient was admitted for further evaluation and management for renal failure.

## 2023-07-17 NOTE — ASSESSMENT & PLAN NOTE
- Holding home Plavix for possible surgical intervention for dialysis  - Continue home Coreg  - Replace home Crestor to Lipitor while in hospital

## 2023-07-17 NOTE — TRANSFER OF CARE
Anesthesia Transfer of Care Note    Patient: Johnathan Patino    Procedure(s) Performed: Procedure(s) (LRB):  Insertion,catheter,tunneledm HD catheter (N/A)    Patient location: PACU    Anesthesia Type: general    Transport from OR: Transported from OR on 6-10 L/min O2 by face mask with adequate spontaneous ventilation    Post pain: adequate analgesia    Post assessment: no apparent anesthetic complications    Post vital signs: stable    Level of consciousness: awake and alert    Nausea/Vomiting: no nausea/vomiting    Complications: none    Transfer of care protocol was followed      Last vitals:   Visit Vitals  /81 (BP Location: Left arm, Patient Position: Lying)   Pulse 77   Temp 37.4 °C (99.3 °F) (Oral)   Resp 14   SpO2 100%

## 2023-07-17 NOTE — PT/OT/SLP PROGRESS
Occupational Therapy      Patient Name:  Johnathan Patino   MRN:  [id number]    Patient not seen today secondary to Off the floor for procedure/surgery (pt gone for placement of dialysis catheter). Will follow-up 7/18/23.    7/17/2023

## 2023-07-17 NOTE — ANESTHESIA POSTPROCEDURE EVALUATION
Anesthesia Post Evaluation    Patient: Johnathan Patino    Procedure(s) Performed: Procedure(s) (LRB):  Insertion,catheter,tunneledm HD catheter (N/A)    Final Anesthesia Type: general      Patient location during evaluation: PACU  Patient participation: Yes- Able to Participate  Level of consciousness: awake and alert and oriented  Post-procedure vital signs: reviewed and stable  Pain management: adequate  Airway patency: patent  HILARIA mitigation strategies: Multimodal analgesia  PONV status at discharge: No PONV  Anesthetic complications: no      Cardiovascular status: hemodynamically stable  Respiratory status: unassisted and spontaneous ventilation  Hydration status: euvolemic  Follow-up not needed.          Vitals Value Taken Time   /84 07/17/23 1405   Temp 37.4 °C (99.3 °F) 07/17/23 1337   Pulse 81 07/17/23 1408   Resp 14 07/17/23 1408   SpO2 99 % 07/17/23 1408   Vitals shown include unvalidated device data.      No case tracking events are documented in the log.      Pain/Temitope Score: Temitope Score: 9 (7/17/2023  1:45 PM)

## 2023-07-17 NOTE — HPI
43-year-old Native-American male presents to the hospital for insertion of dialysis catheter.  Patient presented to outside facility for fatigue weakness.  History of chronic renal disease and was initially scheduled to have AV fistula placed by surgeon at [facility], but this never happened due to him having increased blood pressure.  Recent Er visit for pancreatitis.  Currently with acute on chronic renal failure in need of dialysis.  General surgery consulted.  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  Currently at bed resting, no complaints.  Denies any chest pain/shortness of breath, nausea/vomiting/diarrhea, fever/chills.

## 2023-07-17 NOTE — OR NURSING
1333-Pt rec'd to RR drowsy, stable condition, responds to stimulation, spontaneous breathing on 6L O2 via simple fm, SaO2-100%, will titrate O2 PRN, resp even et unlabored, IV saline locked, RCW HD tunneled catheter observed with guaze/transparent film dressing C/D/I, no pain voiced, will con't to monitor, safety measures in effect.    1338-Blood sugar check was 100.    1345-Placed on room air, NADN, SaO2-100%, will con't to monitor.    1353-Pt asleep but easily aroused, X-ray done for line placement at bedside.    1410-Labs drawn at bedside for hepatitis acute panel, pt resting quietly, NADN, SaO2-99% on room air, no c/o pain at this time, dressing remains C/D/I to RCW HD catheter, transferring to dialysis.    1425-Pt care released to Giorgi(KASHIF) pt resting, vs are 98.6 oral, hr-85, resp-14, b/p 166/98, SaO2-98% on room air.

## 2023-07-17 NOTE — ASSESSMENT & PLAN NOTE
- CR 10.9 and GFR 6 unknown current recent baseline but Cr was 1.4 in March 2021  - General surgery consulted to start HD dialysis, thanks for the assistance  - Nephrology consulted for dialysis, thanks for the assistance  - Avoid nephrotoxic drugs  - Renally dose applicable medications    7/17/2023: To the OR for tunneled hemodialysis catheter insertion. Vein mapping to evaluate bilateral upper extremities to schedule AV fistula as an outpatient.  - Mg and P daily   - Vitamin D level pending

## 2023-07-17 NOTE — OP NOTE
[facility] - Periop Services  Surgery Department  Operative Note    SUMMARY     Date of Procedure: 7/17/2023     Procedure: Procedure(s) (LRB):  Insertion,catheter,tunneledm HD catheter (N/A)     Surgeon(s) and Role:     * Domenic Connor, DO - Primary    Assisting Surgeon: None    Pre-Operative Diagnosis: Acute renal failure [N17.9]  ESRD (end stage renal disease) [N18.6]    Post-Operative Diagnosis: Post-Op Diagnosis Codes:     * Acute renal failure [N17.9]     * ESRD (end stage renal disease) [N18.6]    Anesthesia: General/MAC    Operative Findings (including complications, if any):  Patent right internal jugular vein    Description of Technical Procedures:  Patient was taken the operating room and placed on the operating table in the supine position.  Patient underwent general anesthesia per the anesthesia team.  The bilateral neck and chest were then prepped and draped in usual sterile fashion.  We used an ultrasound to identify the right internal jugular vein which was patent and compressible.  We localize the skin over this area with 0.25% Marcaine plain and then cannulated the right internal jugular vein with an 18 gauge needle.  We had nonpulsatile, dark flow.  We advanced a wire into the internal jugular vein and confirmed adequate placement with fluoroscopy.  We removed the needle.  We then made an incision on the chest and tunneled a 14 Angolan 23 cm tunneled catheter through the subcutaneous tissue and exited the neck site.  We then passed a series of dilators over the wire in Seldinger fashion, followed by a dilator with peel-away sheath.  We then removed the dilator and wire and placed the catheter down the peel-away sheath and removed the sheath.  We had good blood return from both ports.  We confirmed adequate placement with fluoroscopy.  We then flushed both ports with heparinized saline and secured the catheter to the skin with 2-0 silk sutures and closed the neck incision with a 3-0  Vicryl deep dermal suture.  The skin was cleaned and dried, Mastisol Steri-Strips applied sterile dressing applied over catheter.  Final placement confirmed with fluoroscopy.  Patient was awakened, extubated and taken the PACU in stable condition.  Patient tolerated procedure well.      Estimated Blood Loss (EBL): 5 mL           Implants:   Implant Name Type Inv. Item Serial No.  Lot No. LRB No. Used Action   CATH GLIDEPATH 14.5F 23CM 28CM - [id number] Dialysis Catheter CATH GLIDEPATH 14.5F 23CM 28CM  [employer] [id number] Right 1 Implanted       Specimens:   Specimen (24h ago, onward)      None                    Condition: Good    Disposition: PACU - hemodynamically stable.    Attestation: I was present and scrubbed for the entire procedure.

## 2023-07-18 PROBLEM — E55.9 VITAMIN D DEFICIENCY: Status: ACTIVE | Noted: 2023-07-18

## 2023-07-18 PROBLEM — K21.9 GERD (GASTROESOPHAGEAL REFLUX DISEASE): Status: RESOLVED | Noted: 2019-08-30 | Resolved: 2023-07-18

## 2023-07-18 PROBLEM — N19 UREMIA DUE TO INADEQUATE RENAL PERFUSION: Status: RESOLVED | Noted: 2023-07-17 | Resolved: 2023-07-18

## 2023-07-18 LAB
ANION GAP SERPL CALCULATED.3IONS-SCNC: 14 MMOL/L (ref 7–16)
BASOPHILS # BLD AUTO: 0.04 K/UL (ref 0–0.2)
BASOPHILS NFR BLD AUTO: 0.5 % (ref 0–1)
BUN SERPL-MCNC: 39 MG/DL (ref 7–18)
BUN/CREAT SERPL: 5 (ref 6–20)
CALCIUM SERPL-MCNC: 7.3 MG/DL (ref 8.5–10.1)
CHLORIDE SERPL-SCNC: 102 MMOL/L (ref 98–107)
CO2 SERPL-SCNC: 26 MMOL/L (ref 21–32)
CREAT SERPL-MCNC: 7.8 MG/DL (ref 0.7–1.3)
DIFFERENTIAL METHOD BLD: ABNORMAL
EGFR (NO RACE VARIABLE) (RUSH/TITUS): 8 ML/MIN/1.73M2
EOSINOPHIL # BLD AUTO: 0.16 K/UL (ref 0–0.5)
EOSINOPHIL NFR BLD AUTO: 1.9 % (ref 1–4)
ERYTHROCYTE [DISTWIDTH] IN BLOOD BY AUTOMATED COUNT: 14 % (ref 11.5–14.5)
EST. AVERAGE GLUCOSE BLD GHB EST-MCNC: 204 MG/DL
GLUCOSE SERPL-MCNC: 116 MG/DL (ref 74–106)
GLUCOSE SERPL-MCNC: 121 MG/DL (ref 70–105)
GLUCOSE SERPL-MCNC: 158 MG/DL (ref 70–105)
GLUCOSE SERPL-MCNC: 165 MG/DL (ref 70–105)
GLUCOSE SERPL-MCNC: 260 MG/DL (ref 70–105)
GLUCOSE SERPL-MCNC: 263 MG/DL (ref 70–105)
HBA1C MFR BLD HPLC: 8.7 % (ref 4.5–6.6)
HCT VFR BLD AUTO: 26 % (ref 40–54)
HGB BLD-MCNC: 8.6 G/DL (ref 13.5–18)
IMM GRANULOCYTES # BLD AUTO: 0.13 K/UL (ref 0–0.04)
IMM GRANULOCYTES NFR BLD: 1.5 % (ref 0–0.4)
LYMPHOCYTES # BLD AUTO: 2.33 K/UL (ref 1–4.8)
LYMPHOCYTES NFR BLD AUTO: 27.7 % (ref 27–41)
MAGNESIUM SERPL-MCNC: 1.8 MG/DL (ref 1.7–2.3)
MCH RBC QN AUTO: 29.5 PG (ref 27–31)
MCHC RBC AUTO-ENTMCNC: 33.1 G/DL (ref 32–36)
MCV RBC AUTO: 89 FL (ref 80–96)
MONOCYTES # BLD AUTO: 0.63 K/UL (ref 0–0.8)
MONOCYTES NFR BLD AUTO: 7.5 % (ref 2–6)
MPC BLD CALC-MCNC: 8.5 FL (ref 9.4–12.4)
NEUTROPHILS # BLD AUTO: 5.13 K/UL (ref 1.8–7.7)
NEUTROPHILS NFR BLD AUTO: 60.9 % (ref 53–65)
NRBC # BLD AUTO: 0 X10E3/UL
NRBC, AUTO (.00): 0 %
PHOSPHATE SERPL-MCNC: 6.4 MG/DL (ref 2.5–4.5)
PLATELET # BLD AUTO: 438 K/UL (ref 150–400)
POTASSIUM SERPL-SCNC: 3.7 MMOL/L (ref 3.5–5.1)
RBC # BLD AUTO: 2.92 M/UL (ref 4.6–6.2)
SODIUM SERPL-SCNC: 138 MMOL/L (ref 136–145)
WBC # BLD AUTO: 8.42 K/UL (ref 4.5–11)

## 2023-07-18 PROCEDURE — 85025 COMPLETE CBC W/AUTO DIFF WBC: CPT

## 2023-07-18 PROCEDURE — 63600175 PHARM REV CODE 636 W HCPCS: Performed by: INTERNAL MEDICINE

## 2023-07-18 PROCEDURE — 25000003 PHARM REV CODE 250: Performed by: HOSPITALIST

## 2023-07-18 PROCEDURE — 84100 ASSAY OF PHOSPHORUS: CPT

## 2023-07-18 PROCEDURE — 82962 GLUCOSE BLOOD TEST: CPT

## 2023-07-18 PROCEDURE — 97165 OT EVAL LOW COMPLEX 30 MIN: CPT

## 2023-07-18 PROCEDURE — 25000003 PHARM REV CODE 250: Performed by: INTERNAL MEDICINE

## 2023-07-18 PROCEDURE — 90935 HEMODIALYSIS ONE EVALUATION: CPT

## 2023-07-18 PROCEDURE — 99232 PR SUBSEQUENT HOSPITAL CARE,LEVL II: ICD-10-PCS | Mod: ,,, | Performed by: HOSPITALIST

## 2023-07-18 PROCEDURE — 94761 N-INVAS EAR/PLS OXIMETRY MLT: CPT

## 2023-07-18 PROCEDURE — 83735 ASSAY OF MAGNESIUM: CPT

## 2023-07-18 PROCEDURE — 97161 PT EVAL LOW COMPLEX 20 MIN: CPT

## 2023-07-18 PROCEDURE — 11000001 HC ACUTE MED/SURG PRIVATE ROOM

## 2023-07-18 PROCEDURE — 99232 SBSQ HOSP IP/OBS MODERATE 35: CPT | Mod: ,,, | Performed by: HOSPITALIST

## 2023-07-18 PROCEDURE — 63600175 PHARM REV CODE 636 W HCPCS: Performed by: STUDENT IN AN ORGANIZED HEALTH CARE EDUCATION/TRAINING PROGRAM

## 2023-07-18 PROCEDURE — 80048 BASIC METABOLIC PNL TOTAL CA: CPT

## 2023-07-18 PROCEDURE — 83036 HEMOGLOBIN GLYCOSYLATED A1C: CPT

## 2023-07-18 PROCEDURE — 25000003 PHARM REV CODE 250

## 2023-07-18 RX ADMIN — Medication 1000 UNITS: at 01:07

## 2023-07-18 RX ADMIN — SODIUM CHLORIDE: 9 INJECTION, SOLUTION INTRAVENOUS at 09:07

## 2023-07-18 RX ADMIN — CARVEDILOL 3.12 MG: 3.12 TABLET, FILM COATED ORAL at 09:07

## 2023-07-18 RX ADMIN — HYDRALAZINE HYDROCHLORIDE 10 MG: 10 TABLET, FILM COATED ORAL at 09:07

## 2023-07-18 RX ADMIN — HYDRALAZINE HYDROCHLORIDE 10 MG: 10 TABLET, FILM COATED ORAL at 03:07

## 2023-07-18 RX ADMIN — HYDRALAZINE HYDROCHLORIDE 10 MG: 10 TABLET, FILM COATED ORAL at 06:07

## 2023-07-18 RX ADMIN — HEPARIN SODIUM 4000 UNITS: 1000 INJECTION, SOLUTION INTRAVENOUS; SUBCUTANEOUS at 10:07

## 2023-07-18 RX ADMIN — CARVEDILOL 3.12 MG: 3.12 TABLET, FILM COATED ORAL at 01:07

## 2023-07-18 RX ADMIN — TAMSULOSIN HYDROCHLORIDE 0.4 MG: 0.4 CAPSULE ORAL at 01:07

## 2023-07-18 RX ADMIN — TRAZODONE HYDROCHLORIDE 50 MG: 50 TABLET ORAL at 09:07

## 2023-07-18 RX ADMIN — MUPIROCIN: 20 OINTMENT TOPICAL at 01:07

## 2023-07-18 RX ADMIN — INSULIN ASPART 1 UNITS: 100 INJECTION, SOLUTION INTRAVENOUS; SUBCUTANEOUS at 09:07

## 2023-07-18 RX ADMIN — TRAZODONE HYDROCHLORIDE 50 MG: 50 TABLET ORAL at 12:07

## 2023-07-18 RX ADMIN — MUPIROCIN: 20 OINTMENT TOPICAL at 09:07

## 2023-07-18 RX ADMIN — ATORVASTATIN CALCIUM 40 MG: 40 TABLET, FILM COATED ORAL at 09:07

## 2023-07-18 NOTE — ASSESSMENT & PLAN NOTE
- CR 10.9 and GFR 6 unknown current recent baseline but Cr was 1.4 in March 2021  - General surgery consulted to start HD dialysis, thanks for the assistance  - Nephrology consulted for dialysis, thanks for the assistance  - Avoid nephrotoxic drugs  - Renally dose applicable medications    7/17/2023: To the OR for tunneled hemodialysis catheter insertion. Vein mapping to evaluate bilateral upper extremities to schedule AV fistula as an outpatient.  - Mg and P daily   - Vitamin D level pending     7/18/2023: Patient went for dialysis yesterday, took off 1L. Patient to have dialysis again today. Vein mapping completed today.

## 2023-07-18 NOTE — ADDENDUM NOTE
Addendum  created 07/18/23 0837 by Amy Jon CRNA    Child order released for a procedure order, Clinical Note Signed, Intraprocedure Blocks edited, LDA created via procedure documentation, SmartForm saved

## 2023-07-18 NOTE — ANESTHESIA PROCEDURE NOTES
Intubation    Date/Time: 7/17/2023 12:43 PM  Performed by: Amy Jon CRNA  Authorized by: Emanuel Reed DO     Intubation:     Induction:  Intravenous    Intubated:  Postinduction    Mask Ventilation:  Easy with oral airway    Attempts:  1    Attempted By:  CRNA    Difficult Airway Encountered?: No      Complications:  None    Airway Device:  Supraglottic airway/LMA    Airway Device Size:  4.0    Style/Cuff Inflation:  Cuffed    Secured at:  The lips    Placement Verified By:  Capnometry    Complicating Factors:  None    Findings Post-Intubation:  BS equal bilateral and atraumatic/condition of teeth unchanged

## 2023-07-18 NOTE — SUBJECTIVE & OBJECTIVE
Interval History: The patient is resting.  No acute changes.  He has been accepted for outpatient dialysis at the [facility] dialysis clinic.    Review of patient's allergies indicates:  Not on File  Current Facility-Administered Medications   Medication Frequency    0.9%  NaCl infusion PRN    acetaminophen tablet 1,000 mg Q6H PRN    atorvastatin tablet 40 mg QHS    bisacodyL EC tablet 10 mg Daily PRN    carvediloL tablet 3.125 mg BID    dextromethorphan-guaiFENesin  mg/5 ml liquid 10 mL Q6H PRN    dextrose 10% bolus 125 mL 125 mL PRN    dextrose 10% bolus 250 mL 250 mL PRN    glucagon (human recombinant) injection 1 mg PRN    glucose chewable tablet 16 g PRN    glucose chewable tablet 24 g PRN    heparin (porcine) injection 4,000 Units PRN    hydrALAZINE injection 10 mg Q6H PRN    hydrALAZINE tablet 10 mg Q8H    insulin aspart U-100 injection 0-5 Units QID (AC + HS) PRN    mupirocin 2 % ointment BID    ondansetron injection 8 mg Q6H PRN    simethicone chewable tablet 80 mg TID PRN    tamsulosin 24 hr capsule 0.4 mg Daily    traZODone tablet 50 mg Nightly PRN    vitamin D 1000 units tablet 1,000 Units Daily       Objective:     Vital Signs (Most Recent):  Temp: 98.2 °F (36.8 °C) (07/18/23 1647)  Pulse: 84 (07/18/23 1647)  Resp: 17 (07/18/23 1647)  BP: (!) 165/87 (07/18/23 1647)  SpO2: 99 % (07/18/23 0900) Vital Signs (24h Range):  Temp:  [97.8 °F (36.6 °C)-99 °F (37.2 °C)] 98.2 °F (36.8 °C)  Pulse:  [76-96] 84  Resp:  [17-20] 17  SpO2:  [96 %-99 %] 99 %  BP: (105-180)/() 165/87     Weight: 84.2 kg (185 lb 9.6 oz) (07/18/23 0639)  Body mass index is 29.07 kg/m².  Body surface area is 2 meters squared.    I/O last 3 completed shifts:  In: 100 [I.V.:100]  Out: 2205 [Urine:700; Other:1500; Blood:5]     Physical Exam  Vitals reviewed.   Constitutional:       Appearance: He is obese.   HENT:      Head: Normocephalic and atraumatic.   Eyes:      Pupils: Pupils are equal, round, and reactive to light.    Cardiovascular:      Rate and Rhythm: Regular rhythm.   Pulmonary:      Effort: Pulmonary effort is normal.   Abdominal:      Palpations: Abdomen is soft.   Neurological:      Mental Status: He is alert.   Psychiatric:         Mood and Affect: Mood normal.        Significant Labs:  BMP:   Recent Labs   Lab 07/18/23  0525   *      K 3.7      CO2 26   BUN 39*   CREATININE 7.80*   CALCIUM 7.3*   MG 1.8     CBC:   Recent Labs   Lab 07/18/23  0525   WBC 8.42   RBC 2.92*   HGB 8.6*   HCT 26.0*   *   MCV 89.0   MCH 29.5   MCHC 33.1        Significant Imaging:  Labs: Reviewed

## 2023-07-18 NOTE — PROGRESS NOTES
Patient evaluated as he was leaving for dialysis.  HD site looks good.  Vein mapping ordered.  Can follow-up in clinic.  General surgery will sign off.

## 2023-07-18 NOTE — PLAN OF CARE
Problem: Adult Inpatient Plan of Care  Goal: Plan of Care Review  Outcome: Ongoing, Progressing  Goal: Patient-Specific Goal (Individualized)  Outcome: Ongoing, Progressing  Goal: Absence of Hospital-Acquired Illness or Injury  Outcome: Ongoing, Progressing  Goal: Optimal Comfort and Wellbeing  Outcome: Ongoing, Progressing  Goal: Readiness for Transition of Care  Outcome: Ongoing, Progressing     Problem: Diabetes Comorbidity  Goal: Blood Glucose Level Within Targeted Range  Outcome: Ongoing, Progressing     Problem: Fall Injury Risk  Goal: Absence of Fall and Fall-Related Injury  Outcome: Ongoing, Progressing     Problem: Anemia  Goal: Anemia Symptom Improvement  Outcome: Ongoing, Progressing     Problem: Infection  Goal: Absence of Infection Signs and Symptoms  Outcome: Ongoing, Progressing     Problem: Device-Related Complication Risk (Hemodialysis)  Goal: Safe, Effective Therapy Delivery  Outcome: Ongoing, Progressing     Problem: Hemodynamic Instability (Hemodialysis)  Goal: Effective Tissue Perfusion  Outcome: Ongoing, Progressing     Problem: Infection (Hemodialysis)  Goal: Absence of Infection Signs and Symptoms  Outcome: Ongoing, Progressing

## 2023-07-18 NOTE — PT/OT/SLP EVAL
Occupational Therapy   Evaluation and Discharge Note    Name: Johnathan Patino  MRN: [id number]  Admitting Diagnosis: ESRD (end stage renal disease)  Recent Surgery: Procedure(s) (LRB):  Insertion,catheter,tunneledm HD catheter (N/A) 1 Day Post-Op    Recommendations:     Discharge Recommendations: home  Discharge Equipment Recommendations: none  Barriers to discharge:  None    Assessment:     Johnathan Patino is a 43 y.o. male with a medical diagnosis of ESRD (end stage renal disease). At this time, patient is functioning at their prior level of function and does not require further acute OT services.     Plan:     During this hospitalization, patient does not require further acute OT services.  Please re-consult if situation changes.    Plan of Care Reviewed with: patient    Subjective     Chief Complaint: ESRD  Patient/Family Comments/goals: pt agreeable to OT eval     Occupational Profile:  Living Environment: pt lives with significant other in one story home with no steps to enter  Previous level of function: independent with all ADL tasks; utilizes prosthesis to perform ADL t/f; w/c for functional mobility   Roles and Routines: perform self care  Equipment Used at home: wheelchair, shower chair, prosthesis  Assistance upon Discharge: home with family assist as needed    Pain/Comfort:  Pain Rating 1: 0/10    Patients cultural, spiritual, Roman Catholic conflicts given the current situation: no    Objective:     Communicated with: KASHIF Brown prior to session.  Patient found sitting edge of bed with peripheral IV upon OT entry to room.    General Precautions: Standard, fall  Orthopedic Precautions: N/A  Braces: N/A  Respiratory Status: Room air     Occupational Performance:    Bed Mobility:    Not performed; pt sitting EOB upon OT arrival    Functional Mobility/Transfers:  Patient completed Sit <> Stand Transfer with modified independence  with  no assistive device   Patient completed Bed <> Chair Transfer using Stand Pivot  technique with modified independence with no assistive device  Functional Mobility: not performed    Activities of Daily Living:  Lower Body Dressing: independence to lexus prosthesis    Cognitive/Visual Perceptual:  Cognitive/Psychosocial Skills:     -       Oriented to: Person, Place, Time, and Situation   -       Follows Commands/attention:Follows multistep  commands  -       Mood/Affect/Coping skills/emotional control: Cooperative    Physical Exam:  Balance:    -       sitting balance WFL;stadning balance fair  Upper Extremity Range of Motion:     -       Right Upper Extremity: shoulder and hand limited d/t h/o CVA  -       Left Upper Extremity: WFL  Upper Extremity Strength:    -       Right Upper Extremity: 3/5  -       Left Upper Extremity: WFL  Gross motor coordination:   WFL    AMPAC 6 Click ADL:  AMPAC Total Score: 24    Treatment & Education:  Pt educated on OT POC.    Patient left sitting edge of bed with all lines intact and call button in reach    GOALS:   Multidisciplinary Problems       Occupational Therapy Goals       Not on file                    History:     Past Medical History:   Diagnosis Date    Chronic kidney disease (CKD), stage V     Coronary artery disease     Diabetes mellitus     Hypertension     Stroke          Past Surgical History:   Procedure Laterality Date    AMPUTATION, LOWER LIMB Right     INSERTION OF TUNNELED CENTRAL VENOUS HEMODIALYSIS CATHETER  07/17/2023    Dr. Connor    INSERTION, CATHETER, TUNNELED N/A 7/17/2023    Procedure: Insertion,catheter,tunneledm HD catheter;  Surgeon: Domenic Connor DO;  Location: [facility], [place];  Service: General;  Laterality: N/A;       Time Tracking:     OT Date of Treatment: 07/18/23  OT Start Time: 0930  OT Stop Time: 0940  OT Total Time (min): 10 min    Billable Minutes:Evaluation OT min complexity eval    7/18/2023

## 2023-07-18 NOTE — ASSESSMENT & PLAN NOTE
This patient with ESRD.  Starting dialysis today.  7/18/2023.  Outpatient dialysis in [place] on TTS schedule

## 2023-07-18 NOTE — PROGRESS NOTES
[facility] Medicine  Progress Note    Patient Name: Johnathan Patino  MRN: [id number]  Patient Class: IP- Inpatient   Admission Date: 7/16/2023  Length of Stay: 2 days  Attending Physician: Dimitri Myers MD  Primary Care Provider: Primary Doctor No        Subjective:     Principal Problem:ESRD (end stage renal disease)        HPI:  Patient is a 44 yo male who presents to the hospital from [facility] for renal failure needing dialysis. Patient presented to the [facility] Emergency Department earlier today with a complaint of fatigue, decreased appetite and weakness for several days. He denied any associated chest pain, shortness of breath, palpitations, abdominal pain, nausea and vomit. He has history of chronic renal disease and follows with Dr. Turner. According to his girlfriend who was present at the bedside, the patient was supposed to have an AV fistula to start dialysis, but this has not been done due to elevated blood pressure. Of note, the girlfriend noted that the patient went to the [facility] ED on Thursday, July 6th with a complaint of back pain and was treated with pain medication and muscle relaxer. He returned to the emergency department the next day on Friday, July 7th with a complaint of abdominal pain and was diagnosed with pancreatitis, for which he was prescribed oxycodone. Nevertheless, the patient noted that the fatigue, decreased appetite and weakness continues to worsen over the past few days which prompted him to return to the ED earlier today. 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     In [facility] ED, work up was significant for BUN/cR 57/10.4 ( as per medical review baseline CR was 1.41 in March 2021) and GFR 6, K 3.2, Liver enzyme normal, Na 137, H&H 6.0/18 with MCV 87. On arrival, the patient was  afebrile and vital signs stable. He complained of weakness, fatigue and decreased appetite, but had no other concerns or complaints. Patient was admitted for further evaluation and management for renal failure.           Overview/Hospital Course:  7/17/2023: Dr. Connor inserted tunneled dialysis catheter in the RT internal jugular vein today. Vein mapping also ordered to evaluate bilateral upper extremities to schedule AV fistula as an outpatient. SS service consult placed for setting dialysis up outpatient. Nephrology consulted - Dr. Davis. Patient is known to Dr. Turner. 2 weeks ago, the patient went for insertion of a dialysis catheter but his BP was too high off of his meds and the procedure was deferred at [facility].     7/18/2023: Patient states that he is doing well. There is some soreness at the tunneled dialysis catheter site. Site looks clean. Patient underwent dialysis yesterday and 1L of fluid was taken off. Vein mapping was completed today. No overnight events.       Interval History: Patient states that he is doing well. There is some soreness at the tunneled dialysis catheter site. Site looks clean. Patient underwent dialysis yesterday and 1L of fluid was taken off. Vein mapping was completed today. No overnight events.     Review of Systems   Constitutional:  Positive for activity change, appetite change and fatigue. Negative for diaphoresis and fever.   HENT:  Negative for ear pain, postnasal drip and trouble swallowing.    Eyes:  Negative for visual disturbance.   Respiratory:  Negative for cough, chest tightness, shortness of breath and wheezing.    Cardiovascular:  Negative for chest pain, palpitations and leg swelling.   Gastrointestinal:  Negative for abdominal pain and vomiting.   Genitourinary:  Negative for flank pain and hematuria.   Musculoskeletal:  Negative for arthralgias, back pain, neck pain and neck stiffness.   Skin:  Negative for wound.   Neurological:  Positive for  weakness. Negative for seizures and syncope.   Hematological:  Negative for adenopathy.   Psychiatric/Behavioral:  Negative for agitation, behavioral problems and confusion.    Objective:     Vital Signs (Most Recent):  Temp: 98.5 °F (36.9 °C) (07/18/23 1250)  Pulse: 96 (07/18/23 1320)  Resp: 20 (07/18/23 1250)  BP: (!) 162/84 (07/18/23 1320)  SpO2: 99 % (07/18/23 0900) Vital Signs (24h Range):  Temp:  [97.8 °F (36.6 °C)-99 °F (37.2 °C)] 98.5 °F (36.9 °C)  Pulse:  [76-96] 96  Resp:  [18-20] 20  SpO2:  [96 %-99 %] 99 %  BP: ()/() 162/84     Weight: 84.2 kg (185 lb 9.6 oz)  Body mass index is 29.07 kg/m².    Intake/Output Summary (Last 24 hours) at 7/18/2023 1431  Last data filed at 7/18/2023 1250  Gross per 24 hour   Intake 0 ml   Output 4700 ml   Net -4700 ml         Physical Exam  Vitals and nursing note reviewed.   Constitutional:       General: He is not in acute distress.     Appearance: Normal appearance. He is not ill-appearing.   HENT:      Head: Normocephalic and atraumatic.      Right Ear: External ear normal.      Left Ear: External ear normal.      Nose: Nose normal. No congestion or rhinorrhea.   Eyes:      General:         Right eye: No discharge.         Left eye: No discharge.      Conjunctiva/sclera: Conjunctivae normal.   Cardiovascular:      Rate and Rhythm: Normal rate and regular rhythm.      Heart sounds: Normal heart sounds.   Pulmonary:      Effort: Pulmonary effort is normal.      Breath sounds: Normal breath sounds. No wheezing or rhonchi.   Abdominal:      Palpations: Abdomen is soft.      Tenderness: There is no guarding or rebound.   Musculoskeletal:      Cervical back: Normal range of motion and neck supple.      Left lower leg: No edema.      Comments: R BKA noted    Skin:     General: Skin is warm.             Comments: Tunneled dialysis catheter in RT internal jugular vein    2 toes present on LT foot    Neurological:      Mental Status: He is alert and oriented to person,  place, and time.      Sensory: Sensation is intact.      Motor: Weakness present.      Comments: RT arm weakness; +4 strength     LT arm +5 strength        Psychiatric:         Mood and Affect: Mood normal.         Behavior: Behavior normal.         Thought Content: Thought content normal.         Judgment: Judgment normal.           Significant Labs: All pertinent labs within the past 24 hours have been reviewed.    Significant Imaging: I have reviewed all pertinent imaging results/findings within the past 24 hours.      Assessment/Plan:      * ESRD (end stage renal disease)  - CR 10.9 and GFR 6 unknown current recent baseline but Cr was 1.4 in March 2021  - General surgery consulted to start HD dialysis, thanks for the assistance  - Nephrology consulted for dialysis, thanks for the assistance  - Avoid nephrotoxic drugs  - Renally dose applicable medications    7/17/2023: To the OR for tunneled hemodialysis catheter insertion. Vein mapping to evaluate bilateral upper extremities to schedule AV fistula as an outpatient.  - Mg and P daily   - Vitamin D level pending     7/18/2023: Patient went for dialysis yesterday, took off 1L. Patient to have dialysis again today. Vein mapping completed today.     Anemia of chronic disease  - H&H 9.0/26.1 with MCV 86.1 and Iron studies with Iron 53, TIBC 132 and Ferritin 519  - Anemia secondary to chronic renal disease  - Holding home Ferrous Sulfate 325 mg BID for now  - Continue to monitor H&H and transfuse as indicated  - Nephrology consulted, thanks for the assitance    7/17/2023: H/H is stable at 9/26.1.     Hypertension  - Blood pressure is currently controlled  - Continue home Coreg 3.125 mg BID and Hydralazine 10 mg TID  - Holding the other home blood pressure medications  - Continue to monitor blood pressure and adjust BP medications as indicated    7/17/2023: Continue to monitor BP. Make adjustments to medications as necessary.     HX of Left-sided cerebrovascular  accident (CVA)  - Holding home Plavix for possible surgical intervention for dialysis  - Continue home Coreg  - Replace home Crestor to Lipitor while in hospital      Type 2 diabetes mellitus with renal complication  - HBA1c 8.7  - Hold Oral hypoglycemics while patient is in the hospital.  - Nephrology consulted for dialysis  - Will continue to monitor blood glucose in the setting of ESRD    7/17/2023: POCT glucose QID. SSI. Will make adjustments as necessary     Vitamin D deficiency  Vitamin D level 12.2  - Start Vitamin D 1,000 units QD         VTE Risk Mitigation (From admission, onward)         Ordered     heparin (porcine) injection 4,000 Units  As needed (PRN)         07/17/23 1436     Place sequential compression device  Until discontinued         07/17/23 0002                Discharge Planning   ISAURA:      Code Status: Full Code   Is the patient medically ready for discharge?:     Reason for patient still in hospital (select all that apply): Treatment  Discharge Plan A: Home with family                  Jaimie Ayala MD  Department of Hospital Medicine   [facility]

## 2023-07-18 NOTE — PROGRESS NOTES
[facility]  Nephrology  Progress Note    Patient Name: Johnathan Patino  MRN: [id number]  Admission Date: 7/16/2023  Hospital Length of Stay: 2 days  Attending Provider: Dimitri Myers MD   Primary Care Physician: Primary Doctor No  Principal Problem:ESRD (end stage renal disease)    Subjective:     HPI: This patient with history of DM, HTN and CKD who has been followed by  Dr. Turner.  The patient has had continued progression of renal dysfunction.  He has agreed to start dialysis.      Interval History: The patient is resting.  No acute changes.  He has been accepted for outpatient dialysis at the [facility] dialysis clinic.    Review of patient's allergies indicates:  Not on File  Current Facility-Administered Medications   Medication Frequency    0.9%  NaCl infusion PRN    acetaminophen tablet 1,000 mg Q6H PRN    atorvastatin tablet 40 mg QHS    bisacodyL EC tablet 10 mg Daily PRN    carvediloL tablet 3.125 mg BID    dextromethorphan-guaiFENesin  mg/5 ml liquid 10 mL Q6H PRN    dextrose 10% bolus 125 mL 125 mL PRN    dextrose 10% bolus 250 mL 250 mL PRN    glucagon (human recombinant) injection 1 mg PRN    glucose chewable tablet 16 g PRN    glucose chewable tablet 24 g PRN    heparin (porcine) injection 4,000 Units PRN    hydrALAZINE injection 10 mg Q6H PRN    hydrALAZINE tablet 10 mg Q8H    insulin aspart U-100 injection 0-5 Units QID (AC + HS) PRN    mupirocin 2 % ointment BID    ondansetron injection 8 mg Q6H PRN    simethicone chewable tablet 80 mg TID PRN    tamsulosin 24 hr capsule 0.4 mg Daily    traZODone tablet 50 mg Nightly PRN    vitamin D 1000 units tablet 1,000 Units Daily       Objective:     Vital Signs (Most Recent):  Temp: 98.2 °F (36.8 °C) (07/18/23 1647)  Pulse: 84 (07/18/23 1647)  Resp: 17 (07/18/23 1647)  BP: (!) 165/87 (07/18/23 1647)  SpO2: 99 % (07/18/23 0900) Vital Signs (24h Range):  Temp:  [97.8 °F (36.6 °C)-99 °F (37.2 °C)] 98.2 °F (36.8  °C)  Pulse:  [76-96] 84  Resp:  [17-20] 17  SpO2:  [96 %-99 %] 99 %  BP: (105-180)/() 165/87     Weight: 84.2 kg (185 lb 9.6 oz) (07/18/23 0639)  Body mass index is 29.07 kg/m².  Body surface area is 2 meters squared.    I/O last 3 completed shifts:  In: 100 [I.V.:100]  Out: 2205 [Urine:700; Other:1500; Blood:5]     Physical Exam  Vitals reviewed.   Constitutional:       Appearance: He is obese.   HENT:      Head: Normocephalic and atraumatic.   Eyes:      Pupils: Pupils are equal, round, and reactive to light.   Cardiovascular:      Rate and Rhythm: Regular rhythm.   Pulmonary:      Effort: Pulmonary effort is normal.   Abdominal:      Palpations: Abdomen is soft.   Neurological:      Mental Status: He is alert.   Psychiatric:         Mood and Affect: Mood normal.        Significant Labs:  BMP:   Recent Labs   Lab 07/18/23  0525   *      K 3.7      CO2 26   BUN 39*   CREATININE 7.80*   CALCIUM 7.3*   MG 1.8     CBC:   Recent Labs   Lab 07/18/23  0525   WBC 8.42   RBC 2.92*   HGB 8.6*   HCT 26.0*   *   MCV 89.0   MCH 29.5   MCHC 33.1        Significant Imaging:  Labs: Reviewed    Assessment/Plan:     Renal/  * ESRD (end stage renal disease)  This patient with ESRD.  Starting dialysis today.  7/18/2023.  Outpatient dialysis in [place] on TTS schedule    Oncology  Anemia of chronic disease  Chronic condition.    Endocrine  Type 2 diabetes mellitus with renal complication  Chronic condition        Thank you for your consult. I will follow-up with patient. Please contact us if you have any additional questions.    Sancho Davis Jr, MD  Nephrology  [facility]

## 2023-07-18 NOTE — NURSING
Nurses Note -- 4 Eyes      7/17/2023   07:25 PM      Skin assessed during: Q Shift Change      [x] No Altered Skin Integrity Present    []Prevention Measures Documented      [] Yes- Altered Skin Integrity Present or Discovered   [] LDA Added if Not in Epic (Describe Wound)   [] New Altered Skin Integrity was Present on Admit and Documented in LDA   [] Wound Image Taken    Wound Care Consulted? No    Attending Nurse:  Roland Ruggiero RN     Second RN/Staff Member:  Mayra Nevarez RN

## 2023-07-18 NOTE — SUBJECTIVE & OBJECTIVE
Interval History: Patient states that he is doing well. There is some soreness at the tunneled dialysis catheter site. Site looks clean. Patient underwent dialysis yesterday and 1L of fluid was taken off. Vein mapping was completed today. No overnight events.     Review of Systems   Constitutional:  Positive for activity change, appetite change and fatigue. Negative for diaphoresis and fever.   HENT:  Negative for ear pain, postnasal drip and trouble swallowing.    Eyes:  Negative for visual disturbance.   Respiratory:  Negative for cough, chest tightness, shortness of breath and wheezing.    Cardiovascular:  Negative for chest pain, palpitations and leg swelling.   Gastrointestinal:  Negative for abdominal pain and vomiting.   Genitourinary:  Negative for flank pain and hematuria.   Musculoskeletal:  Negative for arthralgias, back pain, neck pain and neck stiffness.   Skin:  Negative for wound.   Neurological:  Positive for weakness. Negative for seizures and syncope.   Hematological:  Negative for adenopathy.   Psychiatric/Behavioral:  Negative for agitation, behavioral problems and confusion.    Objective:     Vital Signs (Most Recent):  Temp: 98.5 °F (36.9 °C) (07/18/23 1250)  Pulse: 96 (07/18/23 1320)  Resp: 20 (07/18/23 1250)  BP: (!) 162/84 (07/18/23 1320)  SpO2: 99 % (07/18/23 0900) Vital Signs (24h Range):  Temp:  [97.8 °F (36.6 °C)-99 °F (37.2 °C)] 98.5 °F (36.9 °C)  Pulse:  [76-96] 96  Resp:  [18-20] 20  SpO2:  [96 %-99 %] 99 %  BP: ()/() 162/84     Weight: 84.2 kg (185 lb 9.6 oz)  Body mass index is 29.07 kg/m².    Intake/Output Summary (Last 24 hours) at 7/18/2023 1431  Last data filed at 7/18/2023 1250  Gross per 24 hour   Intake 0 ml   Output 4700 ml   Net -4700 ml         Physical Exam  Vitals and nursing note reviewed.   Constitutional:       General: He is not in acute distress.     Appearance: Normal appearance. He is not ill-appearing.   HENT:      Head: Normocephalic and atraumatic.       Right Ear: External ear normal.      Left Ear: External ear normal.      Nose: Nose normal. No congestion or rhinorrhea.   Eyes:      General:         Right eye: No discharge.         Left eye: No discharge.      Conjunctiva/sclera: Conjunctivae normal.   Cardiovascular:      Rate and Rhythm: Normal rate and regular rhythm.      Heart sounds: Normal heart sounds.   Pulmonary:      Effort: Pulmonary effort is normal.      Breath sounds: Normal breath sounds. No wheezing or rhonchi.   Abdominal:      Palpations: Abdomen is soft.      Tenderness: There is no guarding or rebound.   Musculoskeletal:      Cervical back: Normal range of motion and neck supple.      Left lower leg: No edema.      Comments: R BKA noted    Skin:     General: Skin is warm.             Comments: Tunneled dialysis catheter in RT internal jugular vein    2 toes present on LT foot    Neurological:      Mental Status: He is alert and oriented to person, place, and time.      Sensory: Sensation is intact.      Motor: Weakness present.      Comments: RT arm weakness; +4 strength     LT arm +5 strength        Psychiatric:         Mood and Affect: Mood normal.         Behavior: Behavior normal.         Thought Content: Thought content normal.         Judgment: Judgment normal.           Significant Labs: All pertinent labs within the past 24 hours have been reviewed.    Significant Imaging: I have reviewed all pertinent imaging results/findings within the past 24 hours.

## 2023-07-18 NOTE — ASSESSMENT & PLAN NOTE
- HBA1c 8.7  - Hold Oral hypoglycemics while patient is in the hospital.  - Nephrology consulted for dialysis  - Will continue to monitor blood glucose in the setting of ESRD    7/17/2023: POCT glucose QID. SSI. Will make adjustments as necessary

## 2023-07-18 NOTE — PT/OT/SLP EVAL
Physical Therapy Evaluation    Patient Name:  Johnathan Patino   MRN:  [id number]    Recommendations:     Discharge Recommendations:     Discharge Equipment Recommendations: none   Barriers to discharge: None    Assessment:     Johnathan Patino is a 43 y.o. male admitted with a medical diagnosis of ESRD (end stage renal disease).  He presents with the following impairments/functional limitations:   Patient at normal baseline mobility. Uses wheelchair for primary mobility and uses prosthetic during transfers. Will sign off.    Rehab Prognosis: Good; patient would benefit from acute skilled PT services to address these deficits and reach maximum level of function.    Recent Surgery: Procedure(s) (LRB):  Insertion,catheter,tunneledm HD catheter (N/A) 1 Day Post-Op    Plan:     During this hospitalization, patient to be seen 1 x/week to address the identified rehab impairments via   and progress toward the following goals:    Plan of Care Expires:  07/18/23    Subjective     Chief Complaint: none  Patient/Family Comments/goals: Patient had dialysis cath placement yesterday  Pain/Comfort:  Pain Rating 1: 0/10    Patients cultural, spiritual, Adventist conflicts given the current situation:      Living Environment:  Lives with family  Prior to admission, patients level of function was independent with wheelchair.  Equipment used at home: wheelchair, shower chair.  DME owned (not currently used): wheelchair.  Upon discharge, patient will have assistance from family.    Objective:     Communicated with nurse prior to session.  Patient found supine with    upon PT entry to room.    General Precautions: Standard,    Orthopedic Precautions:    Braces:    Respiratory Status: Room air    Exams:  Right le aka  Left le 5/5, wnl rom    Functional Mobility:  Bed Mobility:     Supine to Sit: independence  Sit to Supine: independence  Transfers:     Sit to Stand:  independence with no AD      AM-PAC 6 CLICK MOBILITY  Total  Score:18       Treatment & Education:  Spt bed to chair independent    Patient left up in chair with all lines intact.    GOALS:   Multidisciplinary Problems       Physical Therapy Goals       Not on file                    History:     Past Medical History:   Diagnosis Date    Chronic kidney disease (CKD), stage V     Coronary artery disease     Diabetes mellitus     Hypertension     Stroke        Past Surgical History:   Procedure Laterality Date    AMPUTATION, LOWER LIMB Right     INSERTION OF TUNNELED CENTRAL VENOUS HEMODIALYSIS CATHETER  07/17/2023    Dr. Connor    INSERTION, CATHETER, TUNNELED N/A 7/17/2023    Procedure: Insertion,catheter,tunneledm HD catheter;  Surgeon: Domenic Connor DO;  Location: [facility];  Service: General;  Laterality: N/A;       Time Tracking:     PT Received On: 07/18/23  PT Start Time: 0920     PT Stop Time: 0940  PT Total Time (min): 20 min     Billable Minutes: Evaluation 20 07/18/2023

## 2023-07-19 VITALS
HEIGHT: 67 IN | TEMPERATURE: 98 F | DIASTOLIC BLOOD PRESSURE: 76 MMHG | BODY MASS INDEX: 29.13 KG/M2 | OXYGEN SATURATION: 97 % | WEIGHT: 185.63 LBS | HEART RATE: 79 BPM | SYSTOLIC BLOOD PRESSURE: 121 MMHG | RESPIRATION RATE: 20 BRPM

## 2023-07-19 LAB
ANION GAP SERPL CALCULATED.3IONS-SCNC: 13 MMOL/L (ref 7–16)
BASOPHILS # BLD AUTO: 0.05 K/UL (ref 0–0.2)
BASOPHILS NFR BLD AUTO: 0.5 % (ref 0–1)
BUN SERPL-MCNC: 27 MG/DL (ref 7–18)
BUN/CREAT SERPL: 5 (ref 6–20)
CALCIUM SERPL-MCNC: 7.4 MG/DL (ref 8.5–10.1)
CHLORIDE SERPL-SCNC: 101 MMOL/L (ref 98–107)
CO2 SERPL-SCNC: 25 MMOL/L (ref 21–32)
CREAT SERPL-MCNC: 5.69 MG/DL (ref 0.7–1.3)
DIFFERENTIAL METHOD BLD: ABNORMAL
EGFR (NO RACE VARIABLE) (RUSH/TITUS): 12 ML/MIN/1.73M2
EOSINOPHIL # BLD AUTO: 0.2 K/UL (ref 0–0.5)
EOSINOPHIL NFR BLD AUTO: 2 % (ref 1–4)
ERYTHROCYTE [DISTWIDTH] IN BLOOD BY AUTOMATED COUNT: 13.7 % (ref 11.5–14.5)
GLUCOSE SERPL-MCNC: 192 MG/DL (ref 70–105)
GLUCOSE SERPL-MCNC: 205 MG/DL (ref 70–105)
GLUCOSE SERPL-MCNC: 213 MG/DL (ref 74–106)
GLUCOSE SERPL-MCNC: 220 MG/DL (ref 70–105)
GLUCOSE SERPL-MCNC: 274 MG/DL (ref 70–105)
HCT VFR BLD AUTO: 26.3 % (ref 40–54)
HGB BLD-MCNC: 8.5 G/DL (ref 13.5–18)
IMM GRANULOCYTES # BLD AUTO: 0.14 K/UL (ref 0–0.04)
IMM GRANULOCYTES NFR BLD: 1.4 % (ref 0–0.4)
LYMPHOCYTES # BLD AUTO: 3.01 K/UL (ref 1–4.8)
LYMPHOCYTES NFR BLD AUTO: 29.8 % (ref 27–41)
MAGNESIUM SERPL-MCNC: 1.9 MG/DL (ref 1.7–2.3)
MCH RBC QN AUTO: 29.4 PG (ref 27–31)
MCHC RBC AUTO-ENTMCNC: 32.3 G/DL (ref 32–36)
MCV RBC AUTO: 91 FL (ref 80–96)
MONOCYTES # BLD AUTO: 0.74 K/UL (ref 0–0.8)
MONOCYTES NFR BLD AUTO: 7.3 % (ref 2–6)
MPC BLD CALC-MCNC: 8.7 FL (ref 9.4–12.4)
NEUTROPHILS # BLD AUTO: 5.96 K/UL (ref 1.8–7.7)
NEUTROPHILS NFR BLD AUTO: 59 % (ref 53–65)
NRBC # BLD AUTO: 0 X10E3/UL
NRBC, AUTO (.00): 0 %
PHOSPHATE SERPL-MCNC: 5.3 MG/DL (ref 2.5–4.5)
PLATELET # BLD AUTO: 402 K/UL (ref 150–400)
POTASSIUM SERPL-SCNC: 3.8 MMOL/L (ref 3.5–5.1)
RBC # BLD AUTO: 2.89 M/UL (ref 4.6–6.2)
SODIUM SERPL-SCNC: 135 MMOL/L (ref 136–145)
WBC # BLD AUTO: 10.1 K/UL (ref 4.5–11)

## 2023-07-19 PROCEDURE — 80048 BASIC METABOLIC PNL TOTAL CA: CPT

## 2023-07-19 PROCEDURE — 63600175 PHARM REV CODE 636 W HCPCS: Performed by: STUDENT IN AN ORGANIZED HEALTH CARE EDUCATION/TRAINING PROGRAM

## 2023-07-19 PROCEDURE — 83735 ASSAY OF MAGNESIUM: CPT

## 2023-07-19 PROCEDURE — 99239 PR HOSPITAL DISCHARGE DAY,>30 MIN: ICD-10-PCS | Mod: ,,, | Performed by: HOSPITALIST

## 2023-07-19 PROCEDURE — 99239 HOSP IP/OBS DSCHRG MGMT >30: CPT | Mod: ,,, | Performed by: HOSPITALIST

## 2023-07-19 PROCEDURE — 25000003 PHARM REV CODE 250: Performed by: INTERNAL MEDICINE

## 2023-07-19 PROCEDURE — 63600175 PHARM REV CODE 636 W HCPCS: Performed by: INTERNAL MEDICINE

## 2023-07-19 PROCEDURE — 25000003 PHARM REV CODE 250

## 2023-07-19 PROCEDURE — 90935 HEMODIALYSIS ONE EVALUATION: CPT

## 2023-07-19 PROCEDURE — 84100 ASSAY OF PHOSPHORUS: CPT

## 2023-07-19 PROCEDURE — 85025 COMPLETE CBC W/AUTO DIFF WBC: CPT

## 2023-07-19 PROCEDURE — 82962 GLUCOSE BLOOD TEST: CPT

## 2023-07-19 PROCEDURE — 25000003 PHARM REV CODE 250: Performed by: HOSPITALIST

## 2023-07-19 RX ORDER — ISOSORBIDE MONONITRATE 30 MG/1
30 TABLET, EXTENDED RELEASE ORAL DAILY
Status: DISCONTINUED | OUTPATIENT
Start: 2023-07-19 | End: 2023-07-19

## 2023-07-19 RX ORDER — CHOLECALCIFEROL (VITAMIN D3) 25 MCG
1000 TABLET ORAL DAILY
Qty: 30 TABLET | Refills: 0 | Status: SHIPPED | OUTPATIENT
Start: 2023-07-20 | End: 2023-08-19

## 2023-07-19 RX ORDER — NIFEDIPINE 30 MG/1
30 TABLET, EXTENDED RELEASE ORAL DAILY
Status: DISCONTINUED | OUTPATIENT
Start: 2023-07-19 | End: 2023-07-19

## 2023-07-19 RX ADMIN — INSULIN ASPART 2 UNITS: 100 INJECTION, SOLUTION INTRAVENOUS; SUBCUTANEOUS at 06:07

## 2023-07-19 RX ADMIN — CARVEDILOL 3.12 MG: 3.12 TABLET, FILM COATED ORAL at 08:07

## 2023-07-19 RX ADMIN — SODIUM CHLORIDE: 9 INJECTION, SOLUTION INTRAVENOUS at 09:07

## 2023-07-19 RX ADMIN — MUPIROCIN: 20 OINTMENT TOPICAL at 10:07

## 2023-07-19 RX ADMIN — Medication 1000 UNITS: at 08:07

## 2023-07-19 RX ADMIN — HYDRALAZINE HYDROCHLORIDE 10 MG: 10 TABLET, FILM COATED ORAL at 03:07

## 2023-07-19 RX ADMIN — HEPARIN SODIUM 4000 UNITS: 1000 INJECTION, SOLUTION INTRAVENOUS; SUBCUTANEOUS at 09:07

## 2023-07-19 RX ADMIN — HYDRALAZINE HYDROCHLORIDE 10 MG: 10 TABLET, FILM COATED ORAL at 05:07

## 2023-07-19 RX ADMIN — TAMSULOSIN HYDROCHLORIDE 0.4 MG: 0.4 CAPSULE ORAL at 08:07

## 2023-07-19 NOTE — CONSULTS
[facility]  Adult Nutrition  Consult Note         Reason for Assessment  Reason For Assessment: consult   Nutrition Risk Screen: no indicators present    Assessment and Plan  Consult received and appreciated. Consult for renal diet education for patient and wife.  Patient was admitted 7/16 for ESRD.    Visited with patient this afternoon. Provided information to patient and wife on renal diet and associated restrictions for sodium, potassium, and phosphorus. Wife asked appropriate questions and was receptive to information. RD provided contact information should they have questions after discharge.    Patient is on a renal diet with no noted fluid restriction. Recommend continue current diet as able/appropriate and tolerated. Encourage good po intakes.     Last BM 7/18 per flowsheets.    Medications/labs reviewed. RD following.     Skin Integrity  Maurice Risk Assessment  Sensory Perception: 4-->no impairment  Moisture: 4-->rarely moist  Activity: 3-->walks occasionally  Mobility: 3-->slightly limited  Nutrition: 3-->adequate  Friction and Shear: 3-->no apparent problem  Maurice Score: 20        Malnutrition  Is patient malnourished? No      Nutrition Diagnosis    Altered Nutrition Related Lab Values related to ESRD as evidenced by elevated BUN/Cr, low eGFR      Interventions/Recommendations (treatment strategy):  Recommend continue current diet as able/appropriate and tolerated. Encourage good po intakes.    Nutrition Diagnosis Status:   New     Nutrition Risk  Level of Risk/Frequency of Follow-up: low    Recent Labs   Lab 07/19/23  0458 07/19/23  0556 07/19/23  1301   *  --   --    POCGLU  --    < > 205*    < > = values in this interval not displayed.     Comments on Glucose: Glucose elevated. PMH DM2. Will monitor.     Nutrition Prescription / Recommendations  Recommendation/Intervention: Recommend continue current diet as able/appropriate and tolerated. Encourage good po  intakes.  Goals: Weight maintenance, intake % of meals  Nutrition Goal Status: new  Current Diet Order: Renal  Chewing or Swallowing Difficulty?: No Chewing or swallowing difficulty  Recommended Diet: Renal (High Protein)  Recommended Oral Supplement: No Oral Supplements  Is Nutrition Support Recommended: No   Is Education Recommended: Yes - Type: Renal - Education Given: yes  Monitor and Evaluation  % current Intake: New Diet order with no P.O. intake documented currently  % intake to meet estimated needs: 75 - 100 %  Food and Nutrient Adminstration: diet order  Anthropometric Measurements: weight, weight change  Biochemical Data, Medical Tests and Procedures: electrolyte and renal panel, gastrointestinal profile, glucose/endocrine profile, inflammatory profile, lipid profile     Current Medical Diagnosis and Past Medical History  Diagnosis: renal disease  Past Medical History:   Diagnosis Date    Chronic kidney disease (CKD), stage V     Coronary artery disease     Diabetes mellitus     Hypertension     Stroke      Nutrition/Diet History  Spiritual, Cultural Beliefs, Amish Practices, Values that Affect Care: no  Lab/Procedures/Meds  Recent Labs   Lab 07/16/23  2101 07/18/23  0525 07/19/23  0458      < > 135*   K 3.2*   < > 3.8   BUN 56*   < > 27*   CREATININE 10.49*   < > 5.69*   CALCIUM 7.2*   < > 7.4*   ALBUMIN 1.8*  --   --       < > 101   ALT 10*  --   --    AST 14*  --   --    PHOS 8.3*   < > 5.3*    < > = values in this interval not displayed.   Note: BUN/Cr elevated, dx of ESRD. Na+, Ca++ low, Phos elevated. Will monitor.   Last A1c:   Lab Results   Component Value Date    HGBA1C 8.7 (H) 07/18/2023     Lab Results   Component Value Date    RBC 2.89 (L) 07/19/2023    HGB 8.5 (L) 07/19/2023    HCT 26.3 (L) 07/19/2023    MCV 91.0 07/19/2023    MCH 29.4 07/19/2023    MCHC 32.3 07/19/2023    TIBC 132 (L) 07/16/2023     Pertinent Labs Reviewed: reviewed  Pertinent Labs Comments: Sodium: 135  "(L)  Potassium: 3.8  Chloride: 101  CO2: 25  Anion Gap: 13  BUN: 27 (H)  Creatinine: 5.69 (H)  BUN/CREAT RATIO: 5 (L)  eGFR: 12 (L)  Glucose: 213 (H)  Calcium: 7.4 (L)  Phosphorus: 5.3 (H)  Magnesium: 1.9  Pertinent Medications Reviewed: reviewed  Pertinent Medications Comments: atorvastatin, carvedilol, hydralazine, Vitamin D  Anthropometrics  Temp: 97.9 °F (36.6 °C)  Height: 5' 7" (170.2 cm)  Height (inches): 67 in  Weight Method: Bed Scale  Weight: 84.2 kg (185 lb 9.6 oz)  Weight (lb): 185.6 lb  Ideal Body Weight (IBW), Male: 148 lb  % Ideal Body Weight, Male (lb): 125.41 %  BMI (Calculated): 29.1     Estimated/Assessed Needs  RMR (King-St. Jeor Equation): 1690.51     Temp: 97.9 °F (36.6 °C)Oral  Weight Used For Calorie Calculations: 71.2 kg (157 lb)     Energy Calorie Requirements (kcal): 1780-2136kcal (25-30kcal/kg Adj BW)  Weight Used For Protein Calculations: 71.2 kg (157 lb)  Protein Requirements: 86-93g (1.2-1.3g/kg Adj BW)       RDA Method (mL): 1780     Nutrition by Nursing  Diet/Nutrition Received: NPO, ice chips           Last Bowel Movement: 07/18/23                Nutrition Follow-Up  RD Follow-up?: Yes      Chary Giron, MS, RD, LD  Available through Secure Chat   "

## 2023-07-19 NOTE — PLAN OF CARE
[facility]  Discharge Final Note    Primary Care Provider: Primary Doctor No    Expected Discharge Date: 7/19/2023    Final Discharge Note (most recent)       Final Note - 07/19/23 1308          Final Note    Assessment Type Final Discharge Note     Anticipated Discharge Disposition Home or Self Care        Post-Acute Status    Post-Acute Authorization Dialysis     Diaylsis Status Set-up Complete/Auth obtained     Patient choice form signed by patient/caregiver List with quality metrics by geographic area provided;List from CMS Compare;List from System Post-Acute Care     Discharge Delays None known at this time                   Patient discharging home today. SS provided patient with finalized dialysis schedule (TTS 12PM). His first appointment will be tomorrow at 12:00PM. No further dc needs noted at this time.     Important Message from Medicare  Important Message from Medicare regarding Discharge Appeal Rights: Given to patient/caregiver, Explained to patient/caregiver, Signed/date by patient/caregiver     Date IMM was signed: 07/19/23  Time IMM was signed: 1307     Follow-up providers       [facility] #[id number]    [address] [place] [id number]   Phone: [phone]       Next Steps: Follow up    Instructions: Go to Dialysis Tuesday/Thursday/Saturday    South Turner MD   Specialty: Nephrology    [address]  [place] [address]   Phone: [phone]       Next Steps: Follow up in 2 week(s)    Instructions: Follow-up regarding starting dialysis  Dr. Turner will see patient in dialysis    [facility]    [address] [facility] [place] [address]   Phone: [phone]       Next Steps: Follow up    Instructions: As needed for follow-up with Dr. Armstrong              After-discharge care                Dialysis/Infusion       [facility]   Service: Dialysis    Phone: [phone]

## 2023-07-19 NOTE — PROGRESS NOTES
[facility]  Nephrology  Progress Note    Patient Name: Johnathan Patino  MRN: [id number]  Admission Date: 7/16/2023  Hospital Length of Stay: 3 days  Attending Provider: Dimitri Myers MD   Primary Care Physician: Primary Doctor No  Principal Problem:ESRD (end stage renal disease)    Subjective:     HPI: This patient with history of DM, HTN and CKD who has been followed by  Dr. Turner.  The patient has had continued progression of renal dysfunction.  He has agreed to start dialysis.      Interval History: The patient is resting.  He tolerated dialysis earlier today.  He is scheduled for outpatient dialysis a the [facility] for TTS schedule.    Review of patient's allergies indicates:  Not on File  Current Facility-Administered Medications   Medication Frequency    0.9%  NaCl infusion PRN    acetaminophen tablet 1,000 mg Q6H PRN    atorvastatin tablet 40 mg QHS    bisacodyL EC tablet 10 mg Daily PRN    carvediloL tablet 3.125 mg BID    dextromethorphan-guaiFENesin  mg/5 ml liquid 10 mL Q6H PRN    dextrose 10% bolus 125 mL 125 mL PRN    dextrose 10% bolus 250 mL 250 mL PRN    glucagon (human recombinant) injection 1 mg PRN    glucose chewable tablet 16 g PRN    glucose chewable tablet 24 g PRN    heparin (porcine) injection 4,000 Units PRN    hydrALAZINE injection 10 mg Q6H PRN    hydrALAZINE tablet 10 mg Q8H    insulin aspart U-100 injection 0-5 Units QID (AC + HS) PRN    mupirocin 2 % ointment BID    ondansetron injection 8 mg Q6H PRN    simethicone chewable tablet 80 mg TID PRN    traZODone tablet 50 mg Nightly PRN    vitamin D 1000 units tablet 1,000 Units Daily       Objective:     Vital Signs (Most Recent):  Temp: 97.9 °F (36.6 °C) (07/19/23 1210)  Pulse: 79 (07/19/23 1210)  Resp: 20 (07/19/23 1210)  BP: 121/76 (07/19/23 1210)  SpO2: 97 % (07/19/23 0747) Vital Signs (24h Range):  Temp:  [97.8 °F (36.6 °C)-99 °F (37.2 °C)] 97.9 °F (36.6 °C)  Pulse:  [69-84]  79  Resp:  [17-20] 20  SpO2:  [97 %-100 %] 97 %  BP: ()/() 121/76     Weight: 84.2 kg (185 lb 9.6 oz) (07/18/23 0639)  Body mass index is 29.07 kg/m².  Body surface area is 2 meters squared.    I/O last 3 completed shifts:  In: -   Out: 3200 [Urine:700; Other:2500]     Physical Exam  Vitals reviewed.   HENT:      Head: Normocephalic and atraumatic.      Mouth/Throat:      Mouth: Mucous membranes are moist.   Cardiovascular:      Rate and Rhythm: Regular rhythm.   Pulmonary:      Effort: Pulmonary effort is normal.   Abdominal:      Palpations: Abdomen is soft.   Neurological:      Mental Status: He is alert.   Psychiatric:         Mood and Affect: Mood normal.        Significant Labs:  BMP:   Recent Labs   Lab 07/19/23 0458   *   *   K 3.8      CO2 25   BUN 27*   CREATININE 5.69*   CALCIUM 7.4*   MG 1.9     CBC:   Recent Labs   Lab 07/19/23 0458   WBC 10.10   RBC 2.89*   HGB 8.5*   HCT 26.3*   *   MCV 91.0   MCH 29.4   MCHC 32.3        Significant Imaging:  Labs: Reviewed    Assessment/Plan:     Renal/  * ESRD (end stage renal disease)  This patient with ESRD.  Starting dialysis today.  7/18/2023.  Outpatient dialysis in [place] on TTS schedule  7/19/2023.  Continue with outpatient dialysis at the [facility] on TTS schedule    Oncology  Anemia of chronic disease  Chronic condition.        Thank you for your consult. I will follow-up with patient. Please contact us if you have any additional questions.    Sancho Davis Jr, MD  Nephrology  [facility]

## 2023-07-19 NOTE — DISCHARGE SUMMARY
[facility]  [facility] Medicine  Discharge Summary      Patient Name: Johnathan Patino  MRN: [id number]  [facility]: [id number]  Patient Class: IP- Inpatient  Admission Date: 7/16/2023  Hospital Length of Stay: 3 days  Discharge Date and Time:  07/19/2023 3:45 PM  Attending Physician: Dimitri Myers MD   Discharging Provider: Jaimie Ayala MD  Primary Care Provider: Primary Doctor Lorenza    Primary Care Team: Networked reference to record PCT     HPI:   Patient is a 44 yo male who presents to the hospital from [facility] for renal failure needing dialysis. Patient presented to the [facility] Emergency Department earlier today with a complaint of fatigue, decreased appetite and weakness for several days. He denied any associated chest pain, shortness of breath, palpitations, abdominal pain, nausea and vomit. He has history of chronic renal disease and follows with Dr. Turner. According to his girlfriend who was present at the bedside, the patient was supposed to have an AV fistula to start dialysis, but this has not been done due to elevated blood pressure. Of note, the girlfriend noted that the patient went to the [facility] ED on Thursday, July 6th with a complaint of back pain and was treated with pain medication and muscle relaxer. He returned to the emergency department the next day on Friday, July 7th with a complaint of abdominal pain and was diagnosed with pancreatitis, for which he was prescribed oxycodone. Nevertheless, the patient noted that the fatigue, decreased appetite and weakness continues to worsen over the past few days which prompted him to return to the ED earlier today. 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     In [facility] ED, work up was significant for BUN/cR 57/10.4 ( as per medical review baseline CR was 1.41 in March  2021) and GFR 6, K 3.2, Liver enzyme normal, Na 137, H&H 6.0/18 with MCV 87. On arrival, the patient was afebrile and vital signs stable. He complained of weakness, fatigue and decreased appetite, but had no other concerns or complaints. Patient was admitted for further evaluation and management for renal failure.           Procedure(s) (LRB):  Insertion,catheter,tunneledm HD catheter (N/A)      Hospital Course:   Patient presented to [facility] ED for renal failure needing dialysis. He had complaints of fatigue, decreased appetite and weakness for several days. During the hospitalization, the patient underwent insertion of a tunneled dialysis catheter in the RT internal jugular vein and underwent dialysis for 3 days in a row. The patient was set up with dialysis at [facility] in [place] for T/THUR/SAT. Vein mapping was completed for outpatient fistula procedure with Dr. Turner.     The patient met maximum benefit of hospitalization and was discharged with follow-up with his cardiologist (Dr. Armstrong), his PCP (Justa Vasquez), and nephrology (Dr. Turner).   --------------------------    7/17/2023: Dr. Connor inserted tunneled dialysis catheter in the RT internal jugular vein today. Vein mapping also ordered to evaluate bilateral upper extremities to schedule AV fistula as an outpatient. SS service consult placed for setting dialysis up outpatient. Nephrology consulted - Dr. Davis. Patient is known to Dr. Turner. 2 weeks ago, the patient went for insertion of a dialysis catheter but his BP was too high off of his meds and the procedure was deferred at [facility].     7/18/2023: Patient states that he is doing well. There is some soreness at the tunneled dialysis catheter site. Site looks clean. Patient underwent dialysis yesterday and 1L of fluid was taken off. Vein mapping was completed today. No overnight events.     7/19/2023: Patient underwent dialysis today. Patient's renal function has  improved. The patient has been set up for dialysis at [facility] for Tuesday/Thursday/Saturday. Stopped Glipizide for diabetes due to ESRD. Started the patient on Januvia 25mg daily. Patient was determined to be Vitamin D deficient. Prescribed Vitamin D for Vitamin D deficiency. Patient is to follow-up with his cardiologist (Dr. Armstrong), and his PCP Justa Vasquez.        Goals of Care Treatment Preferences:  Code Status: Full Code      Consults:   Consults (From admission, onward)        Status Ordering Provider     Inpatient consult to Registered Dietitian/Nutritionist  Once        Provider:  (Not yet assigned)    Ordered JAROD MANZO     Inpatient consult to Nephrology  Once        Provider:  MD Concepcion Weir Jr., ANDREW     Inpatient consult to Social Work  Once        Provider:  (Not yet assigned)    Completed JAROD MANZO     Inpatient consult to General Surgery  Once        Provider:  (Not yet assigned)    Completed ADRIANNA CAMACHO          Neuro  HX of Left-sided cerebrovascular accident (CVA)  Continue home medications       Cardiac/Vascular  Hypertension  - Blood pressure is currently controlled  - Continue home Coreg 3.125 mg BID and Hydralazine 10 mg TID  - Holding the other home blood pressure medications  - Continue to monitor blood pressure and adjust BP medications as indicated      Renal/  * ESRD (end stage renal disease)  Continue Dialysis T/THURS/SAT    Endocrine  Type 2 diabetes mellitus with renal complication  - HBA1c 8.7    Stopping Glipizide and starting Januvia daily for diabetes       Final Active Diagnoses:    Diagnosis Date Noted POA    PRINCIPAL PROBLEM:  ESRD (end stage renal disease) [N18.6] 07/16/2023 Yes    Anemia of chronic disease [D63.8] 03/03/2021 Yes    Hypertension [I10] 07/16/2023 Yes    HX of Left-sided cerebrovascular accident (CVA) [I63.9] 02/12/2021 Yes    Type 2 diabetes mellitus with renal complication [E11.29] 07/16/2023 Yes    Vitamin D  deficiency [E55.9] 07/18/2023 Yes      Problems Resolved During this Admission:       Discharged Condition: stable    Disposition:     Follow Up:   Contact information for follow-up providers     South Turner MD Follow up in 2 week(s).    Specialty: Nephrology  Why: Follow-up regarding starting dialysis  Dr. Turner will see patient in dialysis  Contact information:  [address]  [place] [address]  [phone]             [facility] Follow up.    Why: As needed for follow-up with Dr. Armstrong  Contact information:  [address] [facility] [place] [address]  [phone]             Primary Doctor No Follow up.    Why: Follow-up with Mali Vasquez (PCP) in 1 week regarding changes in medication                 Contact information for after-discharge care     Dialysis/Infusion     [facility] .    Service: Dialysis  Contact information:  [phone]                           Patient Instructions:   No discharge procedures on file.    Significant Diagnostic Studies: Labs:   BMP:   Recent Labs   Lab 07/18/23 0525 07/19/23 0458   * 213*    135*   K 3.7 3.8    101   CO2 26 25   BUN 39* 27*   CREATININE 7.80* 5.69*   CALCIUM 7.3* 7.4*   MG 1.8 1.9    and CBC   Recent Labs   Lab 07/18/23 0525 07/19/23 0458   WBC 8.42 10.10   HGB 8.6* 8.5*   HCT 26.0* 26.3*   * 402*       Pending Diagnostic Studies:     Procedure Component Value Units Date/Time    EKG 12-lead [[id number]] Collected: 07/17/23 0816    Order Status: Sent Lab Status: In process Updated: 07/17/23 0828    Narrative:      Test Reason : I10,    Vent. Rate : 077 BPM     Atrial Rate : 077 BPM     P-R Int : 146 ms          QRS Dur : 076 ms      QT Int : 440 ms       P-R-T Axes : 052 015 117 degrees     QTc Int : 497 ms    Normal sinus rhythm  ST and T wave abnormality, consider lateral ischemia  Abnormal ECG  No previous ECGs available    Referred By: DYLAN SALAMANCA            Confirmed By:     EKG 12-lead [[id number]]     Order Status: Sent Lab Status: No result     EKG 12-lead [[id number]]     Order Status: Sent Lab Status: No result     FL Limited Non-Billable [[id number]]     Order Status: Sent Lab Status: No result          Medications:  Reconciled Home Medications:      Medication List      START taking these medications    SITagliptin phosphate 25 MG Tab  Commonly known as: JANUVIA  Take 1 tablet (25 mg total) by mouth once daily.     vitamin D 1000 units Tab  Commonly known as: VITAMIN D3  Take 1 tablet (1,000 Units total) by mouth once daily.  Start taking on: July 20, 2023        CONTINUE taking these medications    carvediloL 3.125 MG tablet  Commonly known as: COREG  Take 3.125 mg by mouth 2 (two) times daily.     clopidogreL 75 mg tablet  Commonly known as: PLAVIX  Take 75 mg by mouth once daily.     ferrous sulfate 325 (65 FE) MG EC tablet  Take 325 mg by mouth 2 (two) times daily.     hydrALAZINE 10 MG tablet  Commonly known as: APRESOLINE  Take 10 mg by mouth every 8 (eight) hours.     omeprazole 20 MG capsule  Commonly known as: PRILOSEC  Take 20 mg by mouth once daily.     rosuvastatin 40 MG Tab  Commonly known as: CRESTOR  Take 40 mg by mouth every evening.        STOP taking these medications    furosemide 80 MG tablet  Commonly known as: LASIX     glipiZIDE 2.5 MG Tr24  Commonly known as: GLUCOTROL     isosorbide mononitrate 30 MG 24 hr tablet  Commonly known as: IMDUR     NIFEdipine 30 MG Tbsr  Commonly known as: ADALAT CC     sodium bicarbonate 650 MG tablet     tamsulosin 0.4 mg Cap  Commonly known as: FLOMAX            Indwelling Lines/Drains at time of discharge:   Lines/Drains/Airways     Central Venous Catheter Line  Duration                Hemodialysis Catheter 07/17/23 1300 right subclavian 2 days          Airway  Duration                Airway - Non-Surgical 07/17/23 1243 LMA 2 days                Time spent on the discharge of patient: 45 minutes          Jaimie Ayala MD  Department of Hospital Medicine  [facility]

## 2023-07-19 NOTE — DISCHARGE INSTRUCTIONS
Hospitalist Discharge orders  *Notify your healthcare provider if you experience any of the following: temperature >100.4  * Notify your healthcare provider if you experience any of the following: redness, tenderness.    *Notify your healthcare provider if you experience any of the following: difficulty breathing or     increased cough.  *Notify your physician if you experience any persistent nausea, vomiting, or diarrhea or headache  *Notify your physician if you experience any of the following:severe uncontrolled pain;worsening rash, increased confusion or weakness; dizziness, lightheadedness or visual disturbances     ------------------------------------------  MEDICATIONS:   1) STOP taking Glipizide and start taking Januvia daily for diabetes   2) Start taking Vitamin D 1,000mg daily for Vitamin D Deficiency   3) The only medications for BP are Carvedilol and Hydralazine (STOP taking Nifedipin, Imdur, and Lasix)

## 2023-07-19 NOTE — NURSING
Nurses Note -- 4 Eyes      7/18/2023   7:25 PM      Skin assessed during: Q Shift Change      [x] No Altered Skin Integrity Present    []Prevention Measures Documented      [] Yes- Altered Skin Integrity Present or Discovered   [] LDA Added if Not in Epic (Describe Wound)   [] New Altered Skin Integrity was Present on Admit and Documented in LDA   [] Wound Image Taken    Wound Care Consulted? No    Attending Nurse:  Roland Ruggiero RN     Second RN/Staff Member:  Stephanie Almaguer RN

## 2023-07-19 NOTE — SUBJECTIVE & OBJECTIVE
Interval History: The patient is resting.  He tolerated dialysis earlier today.  He is scheduled for outpatient dialysis a the [facility] for TTS schedule.    Review of patient's allergies indicates:  Not on File  Current Facility-Administered Medications   Medication Frequency    0.9%  NaCl infusion PRN    acetaminophen tablet 1,000 mg Q6H PRN    atorvastatin tablet 40 mg QHS    bisacodyL EC tablet 10 mg Daily PRN    carvediloL tablet 3.125 mg BID    dextromethorphan-guaiFENesin  mg/5 ml liquid 10 mL Q6H PRN    dextrose 10% bolus 125 mL 125 mL PRN    dextrose 10% bolus 250 mL 250 mL PRN    glucagon (human recombinant) injection 1 mg PRN    glucose chewable tablet 16 g PRN    glucose chewable tablet 24 g PRN    heparin (porcine) injection 4,000 Units PRN    hydrALAZINE injection 10 mg Q6H PRN    hydrALAZINE tablet 10 mg Q8H    insulin aspart U-100 injection 0-5 Units QID (AC + HS) PRN    mupirocin 2 % ointment BID    ondansetron injection 8 mg Q6H PRN    simethicone chewable tablet 80 mg TID PRN    traZODone tablet 50 mg Nightly PRN    vitamin D 1000 units tablet 1,000 Units Daily       Objective:     Vital Signs (Most Recent):  Temp: 97.9 °F (36.6 °C) (07/19/23 1210)  Pulse: 79 (07/19/23 1210)  Resp: 20 (07/19/23 1210)  BP: 121/76 (07/19/23 1210)  SpO2: 97 % (07/19/23 0747) Vital Signs (24h Range):  Temp:  [97.8 °F (36.6 °C)-99 °F (37.2 °C)] 97.9 °F (36.6 °C)  Pulse:  [69-84] 79  Resp:  [17-20] 20  SpO2:  [97 %-100 %] 97 %  BP: ()/() 121/76     Weight: 84.2 kg (185 lb 9.6 oz) (07/18/23 0639)  Body mass index is 29.07 kg/m².  Body surface area is 2 meters squared.    I/O last 3 completed shifts:  In: -   Out: 3200 [Urine:700; Other:2500]     Physical Exam  Vitals reviewed.   HENT:      Head: Normocephalic and atraumatic.      Mouth/Throat:      Mouth: Mucous membranes are moist.   Cardiovascular:      Rate and Rhythm: Regular rhythm.   Pulmonary:      Effort: Pulmonary effort is  normal.   Abdominal:      Palpations: Abdomen is soft.   Neurological:      Mental Status: He is alert.   Psychiatric:         Mood and Affect: Mood normal.        Significant Labs:  BMP:   Recent Labs   Lab 07/19/23 0458   *   *   K 3.8      CO2 25   BUN 27*   CREATININE 5.69*   CALCIUM 7.4*   MG 1.9     CBC:   Recent Labs   Lab 07/19/23 0458   WBC 10.10   RBC 2.89*   HGB 8.5*   HCT 26.3*   *   MCV 91.0   MCH 29.4   MCHC 32.3        Significant Imaging:  Labs: Reviewed

## 2023-07-19 NOTE — ASSESSMENT & PLAN NOTE
This patient with ESRD.  Starting dialysis today.  7/18/2023.  Outpatient dialysis in [place] on TTS schedule  7/19/2023.  Continue with outpatient dialysis at the [facility] on TTS schedule

## 2023-07-20 ENCOUNTER — PATIENT OUTREACH (OUTPATIENT)
Dept: ADMINISTRATIVE | Facility: CLINIC | Age: 43
End: 2023-07-20

## 2023-07-20 NOTE — PROGRESS NOTES
C3 nurse attempted to contact Johnathan Patino  for a TCC post hospital discharge follow up call.  Left message with call back information with his brother.   The patient does not have a scheduled HOSFU appointment, and the pt does not have an [facility] PCP.

## 2023-10-16 PROBLEM — I63.9 LEFT-SIDED CEREBROVASCULAR ACCIDENT (CVA): Status: RESOLVED | Noted: 2021-02-12 | Resolved: 2023-10-16

## 2025-05-13 ENCOUNTER — OUTSIDE PLACE OF SERVICE (OUTPATIENT)
Dept: VASCULAR SURGERY | Facility: CLINIC | Age: 45
End: 2025-05-13
Payer: MEDICARE

## 2025-05-13 PROCEDURE — 99223 1ST HOSP IP/OBS HIGH 75: CPT | Mod: ,,, | Performed by: SURGERY

## (undated) DEVICE — Device

## (undated) DEVICE — BAG RECTANGLE RBBRBND 30X36IN

## (undated) DEVICE — GOWN NONREINF SET-IN SLV 2XL

## (undated) DEVICE — DECANTER FLUID TRNSF WHITE 9IN

## (undated) DEVICE — UNDERGLOVES BIOGEL PI SZ 7 LF

## (undated) DEVICE — GLOVE PROTEXIS PI SYN SURG 6.5

## (undated) DEVICE — GLOVE 8.5 PROTEXIS PI BLUE

## (undated) DEVICE — SYR 10CC LUER LOCK

## (undated) DEVICE — SYR ONLY LUER LOCK 20CC

## (undated) DEVICE — SUT SILK 2.0 BLK 18

## (undated) DEVICE — SPONGE COTTON TRAY 4X4IN

## (undated) DEVICE — DRESSING TRANS 4X4 TEGADERM

## (undated) DEVICE — ADHESIVE MASTISOL VIAL 48/BX

## (undated) DEVICE — APPLICATOR CHLORAPREP ORN 26ML

## (undated) DEVICE — SUT 3-0 VICRYL / SH (J416)

## (undated) DEVICE — STRIP MEDI WND CLSR 1/2X4IN

## (undated) DEVICE — COVER PROBE WITH BAND 6X96IN

## (undated) DEVICE — GLOVE PROTEXIS PI SYN SURG 8.0

## (undated) DEVICE — BLADE SURG CARBON STEEL SZ11